# Patient Record
Sex: MALE | Race: WHITE | NOT HISPANIC OR LATINO | Employment: FULL TIME | URBAN - METROPOLITAN AREA
[De-identification: names, ages, dates, MRNs, and addresses within clinical notes are randomized per-mention and may not be internally consistent; named-entity substitution may affect disease eponyms.]

---

## 2023-04-28 DIAGNOSIS — F43.21 ADJUSTMENT DISORDER WITH DEPRESSED MOOD: ICD-10-CM

## 2023-04-28 RX ORDER — FLUOXETINE HYDROCHLORIDE 40 MG/1
CAPSULE ORAL
Qty: 90 CAPSULE | Refills: 1 | Status: SHIPPED | OUTPATIENT
Start: 2023-04-28 | End: 2023-08-22 | Stop reason: ALTCHOICE

## 2023-05-10 ENCOUNTER — TELEPHONE (OUTPATIENT)
Dept: PRIMARY CARE | Facility: CLINIC | Age: 43
End: 2023-05-10
Payer: COMMERCIAL

## 2023-05-10 DIAGNOSIS — L25.8 CONTACT DERMATITIS DUE TO OTHER AGENT, UNSPECIFIED CONTACT DERMATITIS TYPE: Primary | ICD-10-CM

## 2023-05-10 RX ORDER — PREDNISONE 10 MG/1
TABLET ORAL
Qty: 30 TABLET | Refills: 0 | Status: SHIPPED | OUTPATIENT
Start: 2023-05-10 | End: 2023-08-22 | Stop reason: SDUPTHER

## 2023-05-10 NOTE — TELEPHONE ENCOUNTER
Patient has poison ivy. Patient had a virtual visit with health insurance. Patient took last bit of prednisone today. Patient was prescribed 20 mg of prednisone. 18 tab, with taper. Patient is requesting a refill on a prednisone called in to [facility]. Please advise when called into pharmacy.

## 2023-05-10 NOTE — TELEPHONE ENCOUNTER
Spoke with patient-who has had poison ivy recurrent now on his face.  Strongly encouraged him to work his best to stay away from the poison ivy in his yard and be cautious with gloves or tools that might have poison ivy present on it.  He understands repeating the steroid orally should not be done that often.  We will do at this time as this is involving his face.  Once again encouraged him to do his best to maintain avoidance from the poison ivy

## 2023-06-12 ENCOUNTER — TELEPHONE (OUTPATIENT)
Dept: PRIMARY CARE | Facility: CLINIC | Age: 43
End: 2023-06-12
Payer: COMMERCIAL

## 2023-06-12 DIAGNOSIS — R69 TRAVEL-RELATED ILLNESS: Primary | ICD-10-CM

## 2023-06-12 RX ORDER — ACETAZOLAMIDE 125 MG/1
TABLET ORAL
Qty: 20 TABLET | Refills: 3 | Status: SHIPPED | OUTPATIENT
Start: 2023-06-12 | End: 2023-08-22 | Stop reason: SDUPTHER

## 2023-06-12 NOTE — TELEPHONE ENCOUNTER
[place] out west. Wonder if high altitude pills could be called in?    Pharmacy=  [facility] in [place] on [address]

## 2023-08-10 PROBLEM — R03.0 BLOOD PRESSURE ELEVATED WITHOUT HISTORY OF HTN: Status: ACTIVE | Noted: 2023-08-10

## 2023-08-10 PROBLEM — L23.7 POISON IVY DERMATITIS: Status: ACTIVE | Noted: 2023-08-10

## 2023-08-10 PROBLEM — H91.92 HEARING LOSS, LEFT: Status: ACTIVE | Noted: 2023-08-10

## 2023-08-10 PROBLEM — J01.00 SUBACUTE MAXILLARY SINUSITIS: Status: ACTIVE | Noted: 2023-08-10

## 2023-08-10 PROBLEM — J34.2 DEVIATED NASAL SEPTUM: Status: ACTIVE | Noted: 2023-08-10

## 2023-08-10 PROBLEM — M25.562 CHRONIC PAIN OF LEFT KNEE: Status: ACTIVE | Noted: 2023-08-10

## 2023-08-10 PROBLEM — E66.3 OVERWEIGHT (BMI 25.0-29.9): Status: ACTIVE | Noted: 2023-08-10

## 2023-08-10 PROBLEM — J32.2 CHRONIC ETHMOIDAL SINUSITIS: Status: ACTIVE | Noted: 2023-08-10

## 2023-08-10 PROBLEM — F43.21 SITUATIONAL DEPRESSION: Status: ACTIVE | Noted: 2023-08-10

## 2023-08-10 PROBLEM — J32.9 CHRONIC RHINOSINUSITIS: Status: ACTIVE | Noted: 2023-08-10

## 2023-08-10 PROBLEM — E78.5 DYSLIPIDEMIA: Status: ACTIVE | Noted: 2023-08-10

## 2023-08-10 PROBLEM — F41.0 PANIC ATTACKS: Status: ACTIVE | Noted: 2023-08-10

## 2023-08-10 PROBLEM — R09.81 NASAL CONGESTION: Status: ACTIVE | Noted: 2023-08-10

## 2023-08-10 PROBLEM — F41.9 ANXIETY: Status: ACTIVE | Noted: 2023-08-10

## 2023-08-10 PROBLEM — M25.551 HIP PAIN, RIGHT: Status: ACTIVE | Noted: 2023-08-10

## 2023-08-10 PROBLEM — J45.909 REACTIVE AIRWAY DISEASE (HHS-HCC): Status: ACTIVE | Noted: 2023-08-10

## 2023-08-10 PROBLEM — R51.9 FACIAL PAIN: Status: ACTIVE | Noted: 2023-08-10

## 2023-08-10 PROBLEM — R06.09 DYSPNEA ON EXERTION: Status: ACTIVE | Noted: 2023-08-10

## 2023-08-10 PROBLEM — R44.8 FACIAL PRESSURE: Status: ACTIVE | Noted: 2023-08-10

## 2023-08-10 PROBLEM — H93.8X3 EAR FULLNESS, BILATERAL: Status: ACTIVE | Noted: 2023-08-10

## 2023-08-10 PROBLEM — D22.9 MULTIPLE NEVI: Status: ACTIVE | Noted: 2023-08-10

## 2023-08-10 PROBLEM — G47.00 SLEEPLESSNESS: Status: ACTIVE | Noted: 2023-08-10

## 2023-08-10 PROBLEM — G89.29 CHRONIC PAIN OF LEFT KNEE: Status: ACTIVE | Noted: 2023-08-10

## 2023-08-10 PROBLEM — Z97.3 WEARS CONTACT LENSES: Status: ACTIVE | Noted: 2023-08-10

## 2023-08-10 PROBLEM — F45.8 BRUXISM (TEETH GRINDING): Status: ACTIVE | Noted: 2023-08-10

## 2023-08-22 ENCOUNTER — OFFICE VISIT (OUTPATIENT)
Dept: PRIMARY CARE | Facility: CLINIC | Age: 43
End: 2023-08-22
Payer: COMMERCIAL

## 2023-08-22 ENCOUNTER — LAB (OUTPATIENT)
Dept: LAB | Facility: LAB | Age: 43
End: 2023-08-22
Payer: COMMERCIAL

## 2023-08-22 VITALS
TEMPERATURE: 97.1 F | HEIGHT: 71 IN | WEIGHT: 205.2 LBS | BODY MASS INDEX: 28.73 KG/M2 | SYSTOLIC BLOOD PRESSURE: 129 MMHG | OXYGEN SATURATION: 98 % | DIASTOLIC BLOOD PRESSURE: 86 MMHG | HEART RATE: 69 BPM

## 2023-08-22 DIAGNOSIS — F41.9 ANXIETY: ICD-10-CM

## 2023-08-22 DIAGNOSIS — G89.29 CHRONIC LEFT SHOULDER PAIN: Primary | ICD-10-CM

## 2023-08-22 DIAGNOSIS — M25.552 LEFT HIP PAIN: ICD-10-CM

## 2023-08-22 DIAGNOSIS — M25.512 CHRONIC LEFT SHOULDER PAIN: Primary | ICD-10-CM

## 2023-08-22 DIAGNOSIS — E78.5 DYSLIPIDEMIA: ICD-10-CM

## 2023-08-22 DIAGNOSIS — Z00.00 ROUTINE HEALTH MAINTENANCE: ICD-10-CM

## 2023-08-22 DIAGNOSIS — D22.30 NEVUS OF FACE: ICD-10-CM

## 2023-08-22 DIAGNOSIS — D22.9 MULTIPLE NEVI: ICD-10-CM

## 2023-08-22 DIAGNOSIS — F41.0 PANIC ATTACKS: ICD-10-CM

## 2023-08-22 LAB
ALANINE AMINOTRANSFERASE (SGPT) (U/L) IN SER/PLAS: 19 U/L (ref 10–52)
ALBUMIN (G/DL) IN SER/PLAS: 4.4 G/DL (ref 3.4–5)
ALKALINE PHOSPHATASE (U/L) IN SER/PLAS: 77 U/L (ref 33–120)
ANION GAP IN SER/PLAS: 12 MMOL/L (ref 10–20)
ASPARTATE AMINOTRANSFERASE (SGOT) (U/L) IN SER/PLAS: 18 U/L (ref 9–39)
BILIRUBIN TOTAL (MG/DL) IN SER/PLAS: 0.9 MG/DL (ref 0–1.2)
CALCIUM (MG/DL) IN SER/PLAS: 9.4 MG/DL (ref 8.6–10.3)
CARBON DIOXIDE, TOTAL (MMOL/L) IN SER/PLAS: 28 MMOL/L (ref 21–32)
CHLORIDE (MMOL/L) IN SER/PLAS: 103 MMOL/L (ref 98–107)
CHOLESTEROL (MG/DL) IN SER/PLAS: 276 MG/DL (ref 0–199)
CHOLESTEROL IN HDL (MG/DL) IN SER/PLAS: 46.2 MG/DL
CHOLESTEROL/HDL RATIO: 6
CREATININE (MG/DL) IN SER/PLAS: 1.16 MG/DL (ref 0.5–1.3)
ERYTHROCYTE DISTRIBUTION WIDTH (RATIO) BY AUTOMATED COUNT: 13.3 % (ref 11.5–14.5)
ERYTHROCYTE MEAN CORPUSCULAR HEMOGLOBIN CONCENTRATION (G/DL) BY AUTOMATED: 32.6 G/DL (ref 32–36)
ERYTHROCYTE MEAN CORPUSCULAR VOLUME (FL) BY AUTOMATED COUNT: 90 FL (ref 80–100)
ERYTHROCYTES (10*6/UL) IN BLOOD BY AUTOMATED COUNT: 5.05 X10E12/L (ref 4.5–5.9)
GFR MALE: 79 ML/MIN/1.73M2
GLUCOSE (MG/DL) IN SER/PLAS: 86 MG/DL (ref 74–99)
HEMATOCRIT (%) IN BLOOD BY AUTOMATED COUNT: 45.4 % (ref 41–52)
HEMOGLOBIN (G/DL) IN BLOOD: 14.8 G/DL (ref 13.5–17.5)
LDL: 190 MG/DL (ref 0–99)
LEUKOCYTES (10*3/UL) IN BLOOD BY AUTOMATED COUNT: 6 X10E9/L (ref 4.4–11.3)
PLATELETS (10*3/UL) IN BLOOD AUTOMATED COUNT: 297 X10E9/L (ref 150–450)
POTASSIUM (MMOL/L) IN SER/PLAS: 4.4 MMOL/L (ref 3.5–5.3)
PROTEIN TOTAL: 7.4 G/DL (ref 6.4–8.2)
SODIUM (MMOL/L) IN SER/PLAS: 139 MMOL/L (ref 136–145)
THYROTROPIN (MIU/L) IN SER/PLAS BY DETECTION LIMIT <= 0.05 MIU/L: 1.62 MIU/L (ref 0.44–3.98)
TRIGLYCERIDE (MG/DL) IN SER/PLAS: 199 MG/DL (ref 0–149)
UREA NITROGEN (MG/DL) IN SER/PLAS: 17 MG/DL (ref 6–23)
VLDL: 40 MG/DL (ref 0–40)

## 2023-08-22 PROCEDURE — 80053 COMPREHEN METABOLIC PANEL: CPT

## 2023-08-22 PROCEDURE — 80061 LIPID PANEL: CPT

## 2023-08-22 PROCEDURE — 36415 COLL VENOUS BLD VENIPUNCTURE: CPT

## 2023-08-22 PROCEDURE — 85027 COMPLETE CBC AUTOMATED: CPT

## 2023-08-22 PROCEDURE — 84443 ASSAY THYROID STIM HORMONE: CPT

## 2023-08-22 PROCEDURE — 99396 PREV VISIT EST AGE 40-64: CPT | Performed by: STUDENT IN AN ORGANIZED HEALTH CARE EDUCATION/TRAINING PROGRAM

## 2023-08-22 PROCEDURE — 1036F TOBACCO NON-USER: CPT | Performed by: STUDENT IN AN ORGANIZED HEALTH CARE EDUCATION/TRAINING PROGRAM

## 2023-08-22 ASSESSMENT — ENCOUNTER SYMPTOMS
DIAPHORESIS: 0
DYSURIA: 0
SHORTNESS OF BREATH: 0
FEVER: 0
NAUSEA: 0
DIZZINESS: 0
LIGHT-HEADEDNESS: 0
CHILLS: 0
VOMITING: 0

## 2023-08-22 ASSESSMENT — PATIENT HEALTH QUESTIONNAIRE - PHQ9
2. FEELING DOWN, DEPRESSED OR HOPELESS: NOT AT ALL
1. LITTLE INTEREST OR PLEASURE IN DOING THINGS: NOT AT ALL
SUM OF ALL RESPONSES TO PHQ9 QUESTIONS 1 AND 2: 0

## 2023-08-22 NOTE — PROGRESS NOTES
"Subjective   Patient ID: Johnny Henry is a 43 y.o. male who presents for Annual Exam.  He is here for CPE.     Has been having left shoulder pain for last 8 months after ski trip. Seems to worsen with sleeping. 2 weekends ago he fell on his arm as it was across his chest and felt a ripping sensation in the shoulder. Had limited ROM of shoulder and was in a sling. Was using ice, motrin. ROM improving but still painful.     Also after skiing he had left hip pain in lateral hip, worse sitting on floor crosslegged, feels like a pinching sensation. Worse also with running. Had a fall when skiing which he thinks preceded the shoulder and hip pain.         Review of Systems   Constitutional:  Negative for chills, diaphoresis and fever.   HENT:  Negative for hearing loss.    Eyes:  Negative for visual disturbance.   Respiratory:  Negative for shortness of breath.    Cardiovascular:  Negative for chest pain.   Gastrointestinal:  Negative for nausea and vomiting.   Endocrine: Negative for cold intolerance and heat intolerance.   Genitourinary:  Negative for dysuria, scrotal swelling and testicular pain.   Skin:  Negative for rash (poison ivy when outside and exposed).   Neurological:  Negative for dizziness and light-headedness.       /86   Pulse 69   Temp 36.2 °C (97.1 °F)   Ht 1.803 m (5' 11\")   Wt 93.1 kg (205 lb 3.2 oz)   SpO2 98%   BMI 28.62 kg/m²     Objective   Physical Exam  Vitals reviewed.   Constitutional:       General: He is not in acute distress.     Appearance: Normal appearance.   HENT:      Head: Normocephalic.      Right Ear: Tympanic membrane, ear canal and external ear normal. There is no impacted cerumen.      Left Ear: Tympanic membrane, ear canal and external ear normal. There is no impacted cerumen.      Mouth/Throat:      Mouth: Mucous membranes are moist.      Pharynx: Oropharynx is clear. No oropharyngeal exudate or posterior oropharyngeal erythema.   Cardiovascular:      Rate and " Rhythm: Normal rate and regular rhythm.   Pulmonary:      Effort: Pulmonary effort is normal. No respiratory distress.      Breath sounds: Normal breath sounds.   Abdominal:      General: Bowel sounds are normal. There is no distension.      Palpations: Abdomen is soft. There is no mass.      Tenderness: There is no abdominal tenderness. There is no guarding or rebound.   Musculoskeletal:         General: No deformity.      Cervical back: Neck supple. No tenderness.      Comments: No midline spinal tenderness. + apley scratch test bilaterally. - asis compression test.   Lymphadenopathy:      Cervical: No cervical adenopathy.   Skin:     Coloration: Skin is not jaundiced.   Neurological:      Mental Status: He is alert.         Assessment/Plan   Problem List Items Addressed This Visit       Anxiety    Dyslipidemia    Multiple nevi    Panic attacks    Nevus of face     Other Visit Diagnoses       Chronic left shoulder pain    -  Primary    Relevant Orders    Referral to Orthopaedic Surgery    XR shoulder left 2+ views    Referral to Sports Medicine    Referral to Physical Therapy    Left hip pain        Relevant Orders    Referral to Sports Medicine    Referral to Physical Therapy    Routine health maintenance        Relevant Orders    Lipid Panel    Comprehensive Metabolic Panel    CBC (Completed)    TSH with reflex to Free T4 if abnormal        Greater tronchanteric pain syndrome  -Referred to sports medicine  -PT ordered    Left shoulder pain  -referred to shoulder specialist.   -Xray ordered  -PT ordered    Labs updated June '22.    Living situation-after living in the [facility] for years, he and his family moved to [place] in the summer 2021        Panic attacks with situational depression/anxiety-originally occurred around 2000 after he got out of the [employer], when his parents were getting a divorce and he had not yet been able to restart school. [facility]. He notes that  as an adolescent he had ADHD issues-in social anxiety issues as well in the past.   His symptoms recurred early June 2021 while they were putting off her down on the house that he and his wife decided to buy after some 14 years in their small starter home. His anxiousness and apprehension and overall uneasiness is quite similar to what happened before he notes. After establishing with psychology-Dr. Sam Pruett and starting fluoxetine and working with psychiatry his symptoms improved dramatically   Unfortunately he did not follow-up with anyone, and he stopped his medications. Sometime in the springtime his symptoms started to recur with worsening sleeplessness initially terminal insomnia but then also initial insomnia as well, with more stressors with his twin sons. We spoke at length regarding the need for ongoing consistent approach as below.   Presently back on meds. saw counsellor, exercising. avoiding alcohol. Overall doing well. Has not needed the Xanax in over 6 months he states. Exercising consistently. Sleeping better with melatonin now. Encouraged him to be mindful in the small victories, noting accomplishments, though minor, add up. He'll stop the alprazolam, work to restart regular exercise routine, and continue fluoxetine. Using this intermittently, 20mg/day.     Hx Panic attacks-regarding the alprazolam and panic attacks- CSA updated 6/28/22. States he has not used the alprazolam in over 6 months so we will discontinue. He will get back to exercising which helps relieve stress and he will remain on fluoxetine. He has worked to regain the balance between life and work demands which has helped a lot. The final concern is working with his twin son who has a negative attitude.     Sleeplessness - Longstanding issue for yrs. mainly wakes at 3am. he understands fluoxetine will help this. Advised to stop medicating with alprazolam for sleep aid. Encouraged to continue practicing sleep hygiene and melatonin  trial - start w/ 10 mg, advance to 25-30mg and use consistently   He has found that [facility] melatonin 10 mg sufficient and will continue as needed. Unchanged.      Elevated blood pressure-likely related to his stress-we will reassess on follow-up   Presently improved. Improved on recheck today.      dyslipidemia/elevated weight-we'll check annual lipids. With his wife as a dietitian, his diet remains quite healthy.   June '22 Lipids relatively favorable. Though LDL is 127. BMI 28. He'll resume exercising. Reorder labs.      Exercise routine- currently walking daily and playing golf.    He will start a new routine soon. Encouraged him to complete 3-4 days of exercise weekly.      Right hip and knee pain- saw specialist in 2018. He has a small right hip spur. Hip sore mainly after running. Exam today shows Main sore spot superior to the bursa. Encouraged him to review information on line Regarding IT band syndrome & piriformis syndrome. This seems less likely to be bursitis though he will avoid sleeping on that side accordingly. Ice, Aleve, stretching and caution with extended running encouraged follow-up with concerns. Improved and very mild.      Right foot plantar fasciitis - improved now. stretching encouraged. Resolved     Functional sinusitis / Seasonal congestion/eustachian tube presently off Zyrtec and fluticasone following spring '18 allergy and ENT eval per Dr. Espino and Dr. Pinzon. humidified air in winter and if needed, restart Singulair. He will follow up either here or there with concerns.     Reactive airway disease - now running 3 x wk this summer, following unremarkable PFTs per Dr. Espino summer '18. When necessary Singulair in the winter if needed. No issues this year.     Chronic knee pain-less problematic of late     Right scapular spasm-heating pad and stretching encouraged follow-up with concerns for this mild trapezius muscle strain most noticeable work. Encouraged optimizing position  at work particularly sitting with his work station. Unfortunately he does not have the standing desk option.    Dental plan/bruxism- he will continue dental visits semiannually. mouthguard nightly has been used, but he forgets often he notes.     Back nevi - noticed by spouse. These may represent seborrheic keratoses. He will see dermatology accordingly. Saw Dermatologist in spring '19 - Dr. Jean Baptiste, who froze several lesions.  Face nevi-present over last year, unchanged. Advised follow up with dermatology regarding this.      Contact lenses / vision care - Dr. Melissa Davey annually. Now seeing clear choice who did lasix 2022.      Summer safety concerns - sun screen, tick precautions, insect repellant, yard work safety encouraged last year at his physical        Flu shot encouraged after Labor Day- updated 9/22     Tetanus - updated June '22     Follow-up 6 months, sooner as needed.     Labs ordered     CPE completed.  Advised to keep a heart healthy, low fat  diet with fruits and veggies like Mediterranean diet.  Advised on the importance of exercise and maintaining 150 minutes of exercise per week (30 minutes per day 5 days a week).  Advised on regular eye and dental visits.  Discussed age appropriate cancer screening, immunizations and recommendations given.  Discussed avoiding illicit drugs and tobacco. Advised on appropriate use of alcohol.  Advised to wear seat belt.

## 2023-08-31 ENCOUNTER — TELEPHONE (OUTPATIENT)
Dept: PRIMARY CARE | Facility: CLINIC | Age: 43
End: 2023-08-31
Payer: COMMERCIAL

## 2023-08-31 NOTE — TELEPHONE ENCOUNTER
Pt saw Dr Orellana for his annual. While in for that he discussed some shoulder pain and he was referred to see Dr Miguel Hart in Sports Medicine in [facility] per Dr Orellana. Pt is asking if Dr Nevarez has another other physicians he might suggest due to not having any appts until October. He was asking if maybe he could see someone in [facility]. Please advise. Thank you!

## 2023-10-02 ENCOUNTER — APPOINTMENT (OUTPATIENT)
Dept: ORTHOPEDIC SURGERY | Facility: CLINIC | Age: 43
End: 2023-10-02
Payer: COMMERCIAL

## 2023-10-19 ENCOUNTER — APPOINTMENT (OUTPATIENT)
Dept: ORTHOPEDIC SURGERY | Facility: CLINIC | Age: 43
End: 2023-10-19
Payer: COMMERCIAL

## 2023-10-24 ENCOUNTER — OFFICE VISIT (OUTPATIENT)
Dept: PRIMARY CARE | Facility: CLINIC | Age: 43
End: 2023-10-24
Payer: COMMERCIAL

## 2023-10-24 VITALS
HEIGHT: 71 IN | BODY MASS INDEX: 28.87 KG/M2 | WEIGHT: 206.2 LBS | SYSTOLIC BLOOD PRESSURE: 136 MMHG | OXYGEN SATURATION: 98 % | HEART RATE: 66 BPM | TEMPERATURE: 98.8 F | DIASTOLIC BLOOD PRESSURE: 80 MMHG

## 2023-10-24 DIAGNOSIS — E66.3 OVERWEIGHT WITH BODY MASS INDEX (BMI) OF 28 TO 28.9 IN ADULT: Chronic | ICD-10-CM

## 2023-10-24 DIAGNOSIS — R03.0 BLOOD PRESSURE ELEVATED WITHOUT HISTORY OF HTN: Chronic | ICD-10-CM

## 2023-10-24 DIAGNOSIS — F41.9 ANXIETY: Primary | ICD-10-CM

## 2023-10-24 DIAGNOSIS — G89.29 CHRONIC PAIN OF LEFT KNEE: Chronic | ICD-10-CM

## 2023-10-24 DIAGNOSIS — E78.5 DYSLIPIDEMIA, GOAL LDL BELOW 100: Chronic | ICD-10-CM

## 2023-10-24 DIAGNOSIS — M25.562 CHRONIC PAIN OF LEFT KNEE: Chronic | ICD-10-CM

## 2023-10-24 DIAGNOSIS — Z23 NEED FOR INFLUENZA VACCINATION: ICD-10-CM

## 2023-10-24 DIAGNOSIS — M25.552 LEFT HIP PAIN: Chronic | ICD-10-CM

## 2023-10-24 DIAGNOSIS — M25.512 CHRONIC LEFT SHOULDER PAIN: Chronic | ICD-10-CM

## 2023-10-24 DIAGNOSIS — G89.29 CHRONIC LEFT SHOULDER PAIN: Chronic | ICD-10-CM

## 2023-10-24 PROBLEM — M25.551 HIP PAIN, RIGHT: Status: RESOLVED | Noted: 2023-08-10 | Resolved: 2023-10-24

## 2023-10-24 PROBLEM — L23.7 POISON IVY DERMATITIS: Status: RESOLVED | Noted: 2023-08-10 | Resolved: 2023-10-24

## 2023-10-24 PROCEDURE — 3008F BODY MASS INDEX DOCD: CPT | Performed by: INTERNAL MEDICINE

## 2023-10-24 PROCEDURE — 90471 IMMUNIZATION ADMIN: CPT | Performed by: INTERNAL MEDICINE

## 2023-10-24 PROCEDURE — 90686 IIV4 VACC NO PRSV 0.5 ML IM: CPT | Performed by: INTERNAL MEDICINE

## 2023-10-24 PROCEDURE — 1036F TOBACCO NON-USER: CPT | Performed by: INTERNAL MEDICINE

## 2023-10-24 PROCEDURE — 99214 OFFICE O/P EST MOD 30 MIN: CPT | Performed by: INTERNAL MEDICINE

## 2023-10-24 RX ORDER — IBUPROFEN 800 MG/1
800 TABLET ORAL 3 TIMES DAILY PRN
COMMUNITY
Start: 2023-09-11 | End: 2024-04-11 | Stop reason: ALTCHOICE

## 2023-10-24 RX ORDER — FLUOXETINE 10 MG/1
10 CAPSULE ORAL DAILY
Qty: 30 CAPSULE | Refills: 5 | Status: SHIPPED | OUTPATIENT
Start: 2023-10-24 | End: 2024-03-01

## 2023-10-24 ASSESSMENT — PATIENT HEALTH QUESTIONNAIRE - PHQ9
2. FEELING DOWN, DEPRESSED OR HOPELESS: NOT AT ALL
SUM OF ALL RESPONSES TO PHQ9 QUESTIONS 1 AND 2: 0
1. LITTLE INTEREST OR PLEASURE IN DOING THINGS: NOT AT ALL

## 2023-10-24 ASSESSMENT — ENCOUNTER SYMPTOMS
DEPRESSION: 0
LOSS OF SENSATION IN FEET: 0
OCCASIONAL FEELINGS OF UNSTEADINESS: 0

## 2023-10-24 NOTE — PROGRESS NOTES
"Subjective   Patient ID: Johnny Henry is a 43 y.o. male who presents for Follow-up.    Here for follow-up and flu shot.  Left shoulder has been sore for several weeks since he fell running with his boys, playing [employer].    Left knee has been sore since January and the ski accident on and off.    Left hip has been sore since he was working on a home project with baseboard sitting off-and-on for some time    He has been on the fluoxetine for some time he wonders if this needs to continue.  He has not really had any issues with anxiety though he did have 's present for about 6 weeks and he did not sleep well during that time.  Not sleeping better         Review of Systems    Objective   /80   Pulse 66   Temp 37.1 °C (98.8 °F)   Ht 1.803 m (5' 11\")   Wt 93.5 kg (206 lb 3.2 oz)   SpO2 98%   BMI 28.76 kg/m²     Physical Exam  Vitals reviewed.   Constitutional:       Appearance: Normal appearance.   HENT:      Head: Normocephalic and atraumatic.   Eyes:      General: No scleral icterus.        Right eye: No discharge.         Left eye: No discharge.      Extraocular Movements: Extraocular movements intact.      Conjunctiva/sclera: Conjunctivae normal.      Pupils: Pupils are equal, round, and reactive to light.   Cardiovascular:      Rate and Rhythm: Normal rate and regular rhythm.      Pulses: Normal pulses.      Heart sounds: Normal heart sounds. No murmur heard.  Pulmonary:      Effort: Pulmonary effort is normal.      Breath sounds: Normal breath sounds. No wheezing or rhonchi.   Musculoskeletal:         General: No deformity or signs of injury. Normal range of motion.      Cervical back: Normal range of motion and neck supple. No rigidity or tenderness.      Comments: Left shoulder with near normal range of motion-pain localized left deltoid area    Left hip was sore posterior and superior to the left greater trochanteric bursa    Left knee was sore mainly along the joint line medially no " effusions  Cruciate and collateral ligaments appeared intact   Lymphadenopathy:      Cervical: No cervical adenopathy.   Skin:     General: Skin is warm and dry.      Findings: No rash.   Neurological:      General: No focal deficit present.      Mental Status: He is alert and oriented to person, place, and time. Mental status is at baseline.      Cranial Nerves: No cranial nerve deficit.      Sensory: No sensory deficit.      Gait: Gait normal.   Psychiatric:         Mood and Affect: Mood normal.         Behavior: Behavior normal.         Thought Content: Thought content normal.         Judgment: Judgment normal.         Assessment/Plan   Problem List Items Addressed This Visit             ICD-10-CM    Anxiety - Primary F41.9    Relevant Medications    FLUoxetine (PROzac) 10 mg capsule    Blood pressure elevated without history of HTN R03.0    Chronic pain of left knee M25.562, G89.29    Dyslipidemia, goal LDL below 100 E78.5    Left hip pain M25.552    Overweight with body mass index (BMI) of 28 to 28.9 in adult E66.3, Z68.28    Chronic left shoulder pain M25.512, G89.29     Other Visit Diagnoses         Codes    Need for influenza vaccination     Z23    Relevant Orders    Flu vaccine (IIV4) age 6 months and greater, preservative free (Completed)          Panic attacks with situational depression/anxiety-originally occurred around 2000 after he got out of the [employer], when his parents were getting a divorce and he had not yet been able to restart school. [facility] helpful. He notes that as an adolescent he had ADHD issues-in social anxiety issues as well in the past.   His symptoms recurred early June 2021 while they were putting off her down on the house that he and his wife decided to buy after some 14 years in their small starter home. His anxiousness and apprehension and overall uneasiness is quite similar to what happened before he notes. After establishing with psychology-Dr. Vargas  Flynn and starting fluoxetine and working with psychiatry his symptoms improved dramatically.  He has not needed anxiety medicines in some time.              10/23-on 20 mg of fluoxetine consistently he is done quite well this year.  He did have some sleep issues when he had 's over for 6 weeks.  Improved presently.  I suggested trying to reduce the fluoxetine from 20 down to 10 mg daily.  He will call with concerns          Sleeplessness - Longstanding issue for yrs. mainly wakes at 3am. he understands fluoxetine will help this. Advised to stop medicating with alprazolam for sleep aid. Encouraged to continue practicing sleep hygiene and melatonin trial - start w/ 10 mg, advance to 25-30mg and use consistently   He has found that [facility] melatonin 10 mg sufficient and will continue as needed     Elevated blood pressure-likely related to his stress-we will reassess on follow-up              Presently improved.       dyslipidemia/elevated weight-we'll check annual lipids. With his wife as a dietitian, his diet remains quite healthy.   June '22 Lipids relatively favorable. Though LDL is 127. BMI 28. He'll resume exercising             10/23-lipids unfortunately worsened-LDL 90 range.  BMI 28.  Exercise and weight loss     Exercise routine- currently walking daily and playing golf.    He will start a new routine soon. Encouraged him to complete 3-4 days of exercise weekly.              Considering his knee-suggested swimming, elliptical or recumbent cycle 3 to 4 days weekly    Left shoulder pain-since August when he fell playing [employer] onto his left shoulder.  Rotator cuff strength appears near normal no evidence of adhesive capsulitis.  Encouraged heating pad for 20 to 30 minutes and stretch-2 page handout provided/reviewed if not improved he will follow-up with orthopedics    Left hip pain-exam suggest IT band syndrome or piriformis  Rather than Bursitis pain.  He will avoid positions such as sitting  on the floor which has worsened this, and pursue exercises for IT band syndrome and piriformis and follow-up with orthopedics if not improved    Left knee injury-suspect meniscal skin injury from January 2023 CT accident.  Fortunately no mechanical symptoms.  I encouraged him to offload this.  He will avoid other activities that tend to worsen this.  He will watch for mechanical symptoms.  He will follow-up with orthopedics if not improved    Laboratory-suggested 1-2 Aleve as needed         Right hip and knee pain- saw specialist in 2018. He has a small right hip spur. Hip sore mainly after running. Exam today shows Main sore spot superior to the bursa. Encouraged him to review information on line Regarding IT band syndrome & piriformis syndrome. This seems less likely to be bursitis though he will avoid sleeping on that side accordingly. Ice, Aleve, stretching and caution with extended running encouraged follow-up with concerns     Right foot plantar fasciitis - improved now. stretching encouraged     Functional sinusitis / Seasonal congestion/eustachian tube presently off Zyrtec and fluticasone following spring '18 allergy and ENT eval per Dr. Espino and Dr. Pinzon. humidified air in winter and if needed, restart Singulair. He will follow up either here or there with concerns.     Reactive airway disease - now running 3 x wk this summer, following unremarkable PFTs per Dr. Espino summer '18. When necessary Singulair in the winter if needed.       Right scapular spasm-heating pad and stretching encouraged follow-up with concerns for this mild trapezius muscle strain most noticeable work. Encouraged optimizing position at work particularly sitting with his work station. Unfortunately he does not have the standing desk option.        Dental plan/bruxism- he will continue dental visits semiannually. mouthguard nightly has been used, but he forgets often he notes.     Back nevi - noticed by spouse. These may  represent seborrheic keratoses. He will see dermatology accordingly. Saw Dermatologist in spring '19 - Dr. Jean Baptiste, who froze several lesions.     Contact lenses / vision care - Dr. Melissa Davey annually     Summer safety concerns - sun screen, tick precautions, insect repellant, yard work safety encouraged last year at his physical        Flu shot encouraged after Labor Day- updated 10/24/23 - today     Tetanus - updated June '22     Follow-up 6 months, sooner as needed.      Charting was completed using voice recognition technology and may include unintended errors.

## 2023-12-08 ENCOUNTER — ANCILLARY PROCEDURE (OUTPATIENT)
Dept: RADIOLOGY | Facility: CLINIC | Age: 43
End: 2023-12-08
Payer: COMMERCIAL

## 2023-12-08 ENCOUNTER — OFFICE VISIT (OUTPATIENT)
Dept: ORTHOPEDIC SURGERY | Facility: CLINIC | Age: 43
End: 2023-12-08
Payer: COMMERCIAL

## 2023-12-08 DIAGNOSIS — M25.562 ACUTE PAIN OF LEFT KNEE: ICD-10-CM

## 2023-12-08 DIAGNOSIS — M25.552 LEFT HIP PAIN: Primary | ICD-10-CM

## 2023-12-08 PROCEDURE — 73560 X-RAY EXAM OF KNEE 1 OR 2: CPT | Mod: LT,FY

## 2023-12-08 PROCEDURE — 99214 OFFICE O/P EST MOD 30 MIN: CPT | Performed by: ORTHOPAEDIC SURGERY

## 2023-12-08 PROCEDURE — 3008F BODY MASS INDEX DOCD: CPT | Performed by: ORTHOPAEDIC SURGERY

## 2023-12-08 PROCEDURE — 1036F TOBACCO NON-USER: CPT | Performed by: ORTHOPAEDIC SURGERY

## 2023-12-08 PROCEDURE — 73560 X-RAY EXAM OF KNEE 1 OR 2: CPT | Mod: LEFT SIDE | Performed by: ORTHOPAEDIC SURGERY

## 2023-12-08 NOTE — PROGRESS NOTES
History of Present Illness   Patient presents today for follow-up from his left rotator cuff tendinitis as well as a new complaint of left knee pain.  States that shoulder is getting a lot better he has been doing some weight training on it.  The Medrol Dosepak and ibuprofen did seem to help.  In terms of his knee he has had injury several years ago when he was running he had excruciating pain in the knee on the inside.  He states has been on and off but the last 2 years is been aggravating him.  He likes to do [employer] training and he has pain at night as well as pain when the knee is twisted.  Denies any locking up or catching.  Never any surgeries or injections on the knee does not use a brace.  He has been icing it consistently taking ibuprofen.  Tried doing therapy with rehab through percutaneous tube.  This was skiing which related to review the     Review of Systems   GENERAL: Negative for malaise, significant weight loss, fever  MUSCULOSKELETAL: see HPI  NEURO:  Negative     Physical Exam  General appearance well-nourished well-developed AOx3 no acute distress  Right shoulder exam shows full active range of motion of the shoulder 5 out of 5 strength with male muscle strength testing's mild discomfort with external rotation     Left knee exam shows skin intact full active range of motion the medial joint line soreness to palpation positive Darrion's and Apley's compression grind.  Lower extremity is neurovascular tact.  Trace to 1+ effusion.  He is able flex to 110% pain in full extension.     Imaging  No acute fractures or dislocations.  No arthritic change     Assessment   Left knee pain, internal derangement  Left shoulder rotator cuff tendinitis     Plan  1.  MRI of his left knee.  Discussed possibility of underlying meniscus tear as well as cartilage pathology.  Discussed potential arthroscopic surgical options  2.  Ibuprofen.  GI side effects medical risk discussed.  3.  Ice his knee.  Home exercise  program.  Over-the-counter bracing.  4.  Overall his shoulder is improving he will continue with conservative treatment  Follow-up after MRI    Past Medical History:   Diagnosis Date    Acute frontal sinusitis, unspecified 03/27/2018    Acute frontal sinusitis    Acute maxillary sinusitis, unspecified 12/11/2019    Subacute maxillary sinusitis    Acute tonsillitis, unspecified 08/14/2016    Acute tonsillitis    Allergic contact dermatitis due to plants, except food 10/03/2019    Poison ivy dermatitis    Chronic ethmoidal sinusitis 11/30/2021    Chronic ethmoidal sinusitis    Chronic rhinitis 02/14/2020    Chronic rhinosinusitis    Elevated blood-pressure reading, without diagnosis of hypertension 07/30/2014    Elevated blood pressure reading without diagnosis of hypertension    Elevated blood-pressure reading, without diagnosis of hypertension 06/28/2022    Blood pressure elevated without history of HTN    Encounter for examination of ears and hearing without abnormal findings 08/07/2018    Encounter for hearing evaluation    Hesitancy of micturition 03/10/2021    Urinary hesitancy    Lyme disease, unspecified     Lyme disease    Other abnormal auditory perceptions, unspecified ear 05/23/2018    Abnormal auditory perception    Other conditions influencing health status     Pneumonia    Other conditions influencing health status 09/14/2016    Counseling About Travel    Other forms of dyspnea 04/10/2020    Dyspnea on exertion    Other specified disorders of ear, bilateral 03/30/2020    Ear fullness, bilateral    Other symptoms and signs involving general sensations and perceptions 04/21/2021    Facial pressure    Pain in left knee 08/10/2017    Left knee pain, unspecified chronicity    Pain in right hip 08/08/2019    Hip pain, right    Personal history of other diseases of the nervous system and sense organs 01/05/2016    History of acute otitis media    Personal history of other diseases of the nervous system and  sense organs 05/16/2018    History of eustachian tube dysfunction    Personal history of other diseases of the nervous system and sense organs 06/21/2015    History of acute otitis media    Personal history of other diseases of the nervous system and sense organs 05/23/2018    History of eustachian tube dysfunction    Personal history of other diseases of the respiratory system 04/13/2019    History of tonsillitis    Personal history of other diseases of the respiratory system 08/14/2016    History of sore throat    Personal history of other diseases of the respiratory system 12/12/2017    History of acute bronchitis    Personal history of other diseases of the respiratory system 04/13/2019    History of upper respiratory infection    Personal history of other diseases of the respiratory system 11/24/2014    History of sinusitis    Personal history of other diseases of the respiratory system 08/08/2018    History of chronic sinusitis    Personal history of other specified conditions 11/30/2021    History of nasal congestion    Personal history of other specified conditions 08/23/2013    History of nausea    Personal history of other specified conditions 09/26/2022    History of insomnia    Personal history of other specified conditions 04/21/2021    History of facial pain    Unspecified hearing loss, left ear 12/26/2019    Hearing loss, left       Medication Documentation Review Audit       Reviewed by Cheyenne Davis MA (Medical Assistant) on 12/08/23 at 0849      Medication Order Taking? Sig Documenting Provider Last Dose Status   FLUoxetine (PROzac) 10 mg capsule [id number]  Take 1 capsule (10 mg) by mouth once daily. Gerardo Nevarez MD  Active   ibuprofen 800 mg tablet [id number] No Take 1 tablet (800 mg) by mouth 3 times a day as needed. Take with food. Historical Provider, MD Not Taking Active                    Allergies   Allergen Reactions    Nitrofurantoin Monohyd/M-Cryst Nausea Only       Social History      Socioeconomic History    Marital status:      Spouse name: Not on file    Number of children: Not on file    Years of education: Not on file    Highest education level: Not on file   Occupational History    Not on file   Tobacco Use    Smoking status: Never     Passive exposure: Never    Smokeless tobacco: Never   Substance and Sexual Activity    Alcohol use: Yes     Alcohol/week: 9.0 standard drinks of alcohol     Types: 9 Standard drinks or equivalent per week     Comment: 3 times a week    Drug use: Never    Sexual activity: Not on file   Other Topics Concern    Not on file   Social History Narrative    Not on file     Social Determinants of Health     Financial Resource Strain: Not on file   Food Insecurity: Not on file   Transportation Needs: Not on file   Physical Activity: Not on file   Stress: Not on file   Social Connections: Not on file   Intimate Partner Violence: Not on file   Housing Stability: Not on file       History reviewed. No pertinent surgical history.

## 2024-01-02 DIAGNOSIS — M25.552 LEFT HIP PAIN: Primary | ICD-10-CM

## 2024-01-05 ENCOUNTER — HOSPITAL ENCOUNTER (OUTPATIENT)
Dept: RADIOLOGY | Facility: HOSPITAL | Age: 44
Discharge: HOME | End: 2024-01-05
Payer: COMMERCIAL

## 2024-01-05 DIAGNOSIS — M25.552 LEFT HIP PAIN: ICD-10-CM

## 2024-01-05 DIAGNOSIS — M25.562 ACUTE PAIN OF LEFT KNEE: ICD-10-CM

## 2024-01-05 PROCEDURE — 73721 MRI JNT OF LWR EXTRE W/O DYE: CPT | Mod: LEFT SIDE | Performed by: STUDENT IN AN ORGANIZED HEALTH CARE EDUCATION/TRAINING PROGRAM

## 2024-01-05 PROCEDURE — 73721 MRI JNT OF LWR EXTRE W/O DYE: CPT | Mod: LT

## 2024-01-08 ENCOUNTER — OFFICE VISIT (OUTPATIENT)
Dept: ORTHOPEDIC SURGERY | Facility: CLINIC | Age: 44
End: 2024-01-08
Payer: COMMERCIAL

## 2024-01-08 DIAGNOSIS — S83.232D COMPLEX TEAR OF MEDIAL MENISCUS OF LEFT KNEE AS CURRENT INJURY, SUBSEQUENT ENCOUNTER: Primary | ICD-10-CM

## 2024-01-08 PROCEDURE — 3008F BODY MASS INDEX DOCD: CPT | Performed by: ORTHOPAEDIC SURGERY

## 2024-01-08 PROCEDURE — 99214 OFFICE O/P EST MOD 30 MIN: CPT | Performed by: ORTHOPAEDIC SURGERY

## 2024-01-08 PROCEDURE — 1036F TOBACCO NON-USER: CPT | Performed by: ORTHOPAEDIC SURGERY

## 2024-01-08 NOTE — H&P (VIEW-ONLY)
History of Present Illness   Patient returns today states unfortunately his knee and hip are not doing much better.  He has had a notable history of his left knee been pretty sore and uncomfortable for almost a year since a skiing incident.  Ever since he twisted it has been sore uncomfortable with some reproducible mechanical symptoms does get ill stiff and swollen.  Also states his left hip is fairly bothersome with repetitive activities.  Extended period of walking gets reproducible hip pain which gets pretty intense at times and locates that the lateral side of the hip.  Review of Systems   GENERAL: Negative for malaise, significant weight loss, fever  MUSCULOSKELETAL: see HPI  NEURO:  Negative     Physical Exam  General appearance well-nourished well-developed AOx3 no acute distress  Left hip: Moderate pain over the trochanter.  Positive IAN test.  Negative impingement with good motion to the hip    Left knee exam shows skin intact full active range of motion the medial joint line soreness to palpation positive Darrion's and Apley's compression grind.  Lower extremity is neurovascular tact.  Trace to 1+ effusion.  He is able flex to 110% pain in full extension.     Imaging  No acute fractures or dislocations.  No arthritic change  MRI left hip: Moderate tendinosis within the gluteal muscles with some reactive edema.  Small degenerative anterior superior labral tear  MRI of the left knee: Small complex tear medial meniscus undersurface posterior, anterior horn lateral meniscus tear    Assessment   Left knee pain, medial/lateral meniscus tear  Left trochanteric bursitis with labral tear  Plan  1.  I reviewed with the patient overall think his left hip is fairly normal in terms of pathology.  Discussed he does have a small degenerative labral tear which I do not think at this point is bothering him.  I recommended the ultrasound-guided injection to the trochanter 8 cc lidocaine of 1 of cc of Celestone.  To  treat this is predominantly trochanteric bursitis and gluteal tendinitis, issue.  I discussed that I think this will resolve with time.  We outlined all of his MRI findings    2.  Left knee: I recommended left knee arthroscopy with partial medial and lateral meniscectomy with a possible repair  Menisectomy plan of care:  Risks benefits and alternatives to surgery were discussed including but not limited to Infection, bleeding, neurovascular injury, pain and dysfunction, hardware related complications including cutout failure breakage, loss of function, motion, and permanent disability as well as the cardiovascular and pulmonary complications from anesthesia including death and DVT. Patient and family accept these risks.  We discussed specifically post meniscectomy pain, incomplete pain relief, meniscus retear, progressive arthritis, intraoperative decisions in regards to other pathology, and the potential for future revision surgeries including arthroplasty    Plan for outpatient surgery   1. 1 week postop follow up   2. Percocet for postop pain relief. OARRS has been reviewed and is consistent with prescribed medications. This report is scanned into the electronic medical record. The risks of abuse, dependence, addiction and diversion were considered. The medication is felt to be clinically appropriate based on documented diagnosis .  3.  Pre-CERT for removal postoperative knee brace    Past Medical History:   Diagnosis Date    Acute frontal sinusitis, unspecified 03/27/2018    Acute frontal sinusitis    Acute maxillary sinusitis, unspecified 12/11/2019    Subacute maxillary sinusitis    Acute tonsillitis, unspecified 08/14/2016    Acute tonsillitis    Allergic contact dermatitis due to plants, except food 10/03/2019    Poison ivy dermatitis    Chronic ethmoidal sinusitis 11/30/2021    Chronic ethmoidal sinusitis    Chronic rhinitis 02/14/2020    Chronic rhinosinusitis    Elevated blood-pressure reading, without  diagnosis of hypertension 07/30/2014    Elevated blood pressure reading without diagnosis of hypertension    Elevated blood-pressure reading, without diagnosis of hypertension 06/28/2022    Blood pressure elevated without history of HTN    Encounter for examination of ears and hearing without abnormal findings 08/07/2018    Encounter for hearing evaluation    Hesitancy of micturition 03/10/2021    Urinary hesitancy    Lyme disease, unspecified     Lyme disease    Other abnormal auditory perceptions, unspecified ear 05/23/2018    Abnormal auditory perception    Other conditions influencing health status     Pneumonia    Other conditions influencing health status 09/14/2016    Counseling About Travel    Other forms of dyspnea 04/10/2020    Dyspnea on exertion    Other specified disorders of ear, bilateral 03/30/2020    Ear fullness, bilateral    Other symptoms and signs involving general sensations and perceptions 04/21/2021    Facial pressure    Pain in left knee 08/10/2017    Left knee pain, unspecified chronicity    Pain in right hip 08/08/2019    Hip pain, right    Personal history of other diseases of the nervous system and sense organs 01/05/2016    History of acute otitis media    Personal history of other diseases of the nervous system and sense organs 05/16/2018    History of eustachian tube dysfunction    Personal history of other diseases of the nervous system and sense organs 06/21/2015    History of acute otitis media    Personal history of other diseases of the nervous system and sense organs 05/23/2018    History of eustachian tube dysfunction    Personal history of other diseases of the respiratory system 04/13/2019    History of tonsillitis    Personal history of other diseases of the respiratory system 08/14/2016    History of sore throat    Personal history of other diseases of the respiratory system 12/12/2017    History of acute bronchitis    Personal history of other diseases of the respiratory  system 04/13/2019    History of upper respiratory infection    Personal history of other diseases of the respiratory system 11/24/2014    History of sinusitis    Personal history of other diseases of the respiratory system 08/08/2018    History of chronic sinusitis    Personal history of other specified conditions 11/30/2021    History of nasal congestion    Personal history of other specified conditions 08/23/2013    History of nausea    Personal history of other specified conditions 09/26/2022    History of insomnia    Personal history of other specified conditions 04/21/2021    History of facial pain    Unspecified hearing loss, left ear 12/26/2019    Hearing loss, left       Medication Documentation Review Audit       Reviewed by Altagracia Jiménez on 01/08/24 at 1124      Medication Order Taking? Sig Documenting Provider Last Dose Status   FLUoxetine (PROzac) 10 mg capsule [id number]  Take 1 capsule (10 mg) by mouth once daily. Gerardo Nevarez MD  Active   ibuprofen 800 mg tablet [id number] No Take 1 tablet (800 mg) by mouth 3 times a day as needed. Take with food. Historical Provider, MD Not Taking Active                    Allergies   Allergen Reactions    Nitrofurantoin Monohyd/M-Cryst Nausea Only       Social History     Socioeconomic History    Marital status:      Spouse name: Not on file    Number of children: Not on file    Years of education: Not on file    Highest education level: Not on file   Occupational History    Not on file   Tobacco Use    Smoking status: Never     Passive exposure: Never    Smokeless tobacco: Never   Substance and Sexual Activity    Alcohol use: Yes     Alcohol/week: 9.0 standard drinks of alcohol     Types: 9 Standard drinks or equivalent per week     Comment: 3 times a week    Drug use: Never    Sexual activity: Not on file   Other Topics Concern    Not on file   Social History Narrative    Not on file     Social Determinants of Health     Financial Resource Strain: Not on  file   Food Insecurity: Not on file   Transportation Needs: Not on file   Physical Activity: Not on file   Stress: Not on file   Social Connections: Not on file   Intimate Partner Violence: Not on file   Housing Stability: Not on file       History reviewed. No pertinent surgical history.

## 2024-01-08 NOTE — PROGRESS NOTES
History of Present Illness   Patient returns today states unfortunately his knee and hip are not doing much better.  He has had a notable history of his left knee been pretty sore and uncomfortable for almost a year since a skiing incident.  Ever since he twisted it has been sore uncomfortable with some reproducible mechanical symptoms does get ill stiff and swollen.  Also states his left hip is fairly bothersome with repetitive activities.  Extended period of walking gets reproducible hip pain which gets pretty intense at times and locates that the lateral side of the hip.  Review of Systems   GENERAL: Negative for malaise, significant weight loss, fever  MUSCULOSKELETAL: see HPI  NEURO:  Negative     Physical Exam  General appearance well-nourished well-developed AOx3 no acute distress  Left hip: Moderate pain over the trochanter.  Positive IAN test.  Negative impingement with good motion to the hip    Left knee exam shows skin intact full active range of motion the medial joint line soreness to palpation positive Darrion's and Apley's compression grind.  Lower extremity is neurovascular tact.  Trace to 1+ effusion.  He is able flex to 110% pain in full extension.     Imaging  No acute fractures or dislocations.  No arthritic change  MRI left hip: Moderate tendinosis within the gluteal muscles with some reactive edema.  Small degenerative anterior superior labral tear  MRI of the left knee: Small complex tear medial meniscus undersurface posterior, anterior horn lateral meniscus tear    Assessment   Left knee pain, medial/lateral meniscus tear  Left trochanteric bursitis with labral tear  Plan  1.  I reviewed with the patient overall think his left hip is fairly normal in terms of pathology.  Discussed he does have a small degenerative labral tear which I do not think at this point is bothering him.  I recommended the ultrasound-guided injection to the trochanter 8 cc lidocaine of 1 of cc of Celestone.  To  treat this is predominantly trochanteric bursitis and gluteal tendinitis, issue.  I discussed that I think this will resolve with time.  We outlined all of his MRI findings    2.  Left knee: I recommended left knee arthroscopy with partial medial and lateral meniscectomy with a possible repair  Menisectomy plan of care:  Risks benefits and alternatives to surgery were discussed including but not limited to Infection, bleeding, neurovascular injury, pain and dysfunction, hardware related complications including cutout failure breakage, loss of function, motion, and permanent disability as well as the cardiovascular and pulmonary complications from anesthesia including death and DVT. Patient and family accept these risks.  We discussed specifically post meniscectomy pain, incomplete pain relief, meniscus retear, progressive arthritis, intraoperative decisions in regards to other pathology, and the potential for future revision surgeries including arthroplasty    Plan for outpatient surgery   1. 1 week postop follow up   2. Percocet for postop pain relief. OARRS has been reviewed and is consistent with prescribed medications. This report is scanned into the electronic medical record. The risks of abuse, dependence, addiction and diversion were considered. The medication is felt to be clinically appropriate based on documented diagnosis .  3.  Pre-CERT for removal postoperative knee brace    Past Medical History:   Diagnosis Date    Acute frontal sinusitis, unspecified 03/27/2018    Acute frontal sinusitis    Acute maxillary sinusitis, unspecified 12/11/2019    Subacute maxillary sinusitis    Acute tonsillitis, unspecified 08/14/2016    Acute tonsillitis    Allergic contact dermatitis due to plants, except food 10/03/2019    Poison ivy dermatitis    Chronic ethmoidal sinusitis 11/30/2021    Chronic ethmoidal sinusitis    Chronic rhinitis 02/14/2020    Chronic rhinosinusitis    Elevated blood-pressure reading, without  diagnosis of hypertension 07/30/2014    Elevated blood pressure reading without diagnosis of hypertension    Elevated blood-pressure reading, without diagnosis of hypertension 06/28/2022    Blood pressure elevated without history of HTN    Encounter for examination of ears and hearing without abnormal findings 08/07/2018    Encounter for hearing evaluation    Hesitancy of micturition 03/10/2021    Urinary hesitancy    Lyme disease, unspecified     Lyme disease    Other abnormal auditory perceptions, unspecified ear 05/23/2018    Abnormal auditory perception    Other conditions influencing health status     Pneumonia    Other conditions influencing health status 09/14/2016    Counseling About Travel    Other forms of dyspnea 04/10/2020    Dyspnea on exertion    Other specified disorders of ear, bilateral 03/30/2020    Ear fullness, bilateral    Other symptoms and signs involving general sensations and perceptions 04/21/2021    Facial pressure    Pain in left knee 08/10/2017    Left knee pain, unspecified chronicity    Pain in right hip 08/08/2019    Hip pain, right    Personal history of other diseases of the nervous system and sense organs 01/05/2016    History of acute otitis media    Personal history of other diseases of the nervous system and sense organs 05/16/2018    History of eustachian tube dysfunction    Personal history of other diseases of the nervous system and sense organs 06/21/2015    History of acute otitis media    Personal history of other diseases of the nervous system and sense organs 05/23/2018    History of eustachian tube dysfunction    Personal history of other diseases of the respiratory system 04/13/2019    History of tonsillitis    Personal history of other diseases of the respiratory system 08/14/2016    History of sore throat    Personal history of other diseases of the respiratory system 12/12/2017    History of acute bronchitis    Personal history of other diseases of the respiratory  system 04/13/2019    History of upper respiratory infection    Personal history of other diseases of the respiratory system 11/24/2014    History of sinusitis    Personal history of other diseases of the respiratory system 08/08/2018    History of chronic sinusitis    Personal history of other specified conditions 11/30/2021    History of nasal congestion    Personal history of other specified conditions 08/23/2013    History of nausea    Personal history of other specified conditions 09/26/2022    History of insomnia    Personal history of other specified conditions 04/21/2021    History of facial pain    Unspecified hearing loss, left ear 12/26/2019    Hearing loss, left       Medication Documentation Review Audit       Reviewed by Altagracia Jiménez on 01/08/24 at 1124      Medication Order Taking? Sig Documenting Provider Last Dose Status   FLUoxetine (PROzac) 10 mg capsule [id number]  Take 1 capsule (10 mg) by mouth once daily. Gerardo Nevarez MD  Active   ibuprofen 800 mg tablet [id number] No Take 1 tablet (800 mg) by mouth 3 times a day as needed. Take with food. Historical Provider, MD Not Taking Active                    Allergies   Allergen Reactions    Nitrofurantoin Monohyd/M-Cryst Nausea Only       Social History     Socioeconomic History    Marital status:      Spouse name: Not on file    Number of children: Not on file    Years of education: Not on file    Highest education level: Not on file   Occupational History    Not on file   Tobacco Use    Smoking status: Never     Passive exposure: Never    Smokeless tobacco: Never   Substance and Sexual Activity    Alcohol use: Yes     Alcohol/week: 9.0 standard drinks of alcohol     Types: 9 Standard drinks or equivalent per week     Comment: 3 times a week    Drug use: Never    Sexual activity: Not on file   Other Topics Concern    Not on file   Social History Narrative    Not on file     Social Determinants of Health     Financial Resource Strain: Not on  file   Food Insecurity: Not on file   Transportation Needs: Not on file   Physical Activity: Not on file   Stress: Not on file   Social Connections: Not on file   Intimate Partner Violence: Not on file   Housing Stability: Not on file       History reviewed. No pertinent surgical history.

## 2024-01-09 ENCOUNTER — TRANSCRIBE ORDERS (OUTPATIENT)
Dept: OPERATING ROOM | Facility: HOSPITAL | Age: 44
End: 2024-01-09
Payer: COMMERCIAL

## 2024-01-09 DIAGNOSIS — S83.242A OTHER TEAR OF MEDIAL MENISCUS, CURRENT INJURY, LEFT KNEE, INITIAL ENCOUNTER: Primary | ICD-10-CM

## 2024-01-10 ENCOUNTER — TELEPHONE (OUTPATIENT)
Dept: ORTHOPEDIC SURGERY | Facility: CLINIC | Age: 44
End: 2024-01-10
Payer: COMMERCIAL

## 2024-01-10 NOTE — TELEPHONE ENCOUNTER
01/10/24  LVM for pt re availability of crutches for post-op use following sx.  Asked him to contact our office and let us know if he has, or needs them.

## 2024-01-16 DIAGNOSIS — S83.232D COMPLEX TEAR OF MEDIAL MENISCUS OF LEFT KNEE AS CURRENT INJURY, SUBSEQUENT ENCOUNTER: Primary | ICD-10-CM

## 2024-01-16 RX ORDER — OXYCODONE AND ACETAMINOPHEN 5; 325 MG/1; MG/1
1 TABLET ORAL EVERY 4 HOURS PRN
Qty: 20 TABLET | Refills: 0 | Status: SHIPPED | OUTPATIENT
Start: 2024-01-16 | End: 2024-01-23

## 2024-01-16 NOTE — PREPROCEDURE INSTRUCTIONS
Patient and nurse discussed pre-operative instructions of the location of procedure, NPO status, home medications, and what to aspect after procedure.  Patient is aware to not smoke nicotine products, drink alcohol, or do any drugs within 24hrs of procedure.  Not to bring any valuables and to not wear any metal, jewelry, piercing's or beauty products for surgery.   Informed about the call the business day prior to procedure that will be give the exact time of arrival on the day of surgery, between 2PM-4PM.  Any questions call the surgeons office, and any questions about Pre-Admission Testing call [phone]

## 2024-01-17 ENCOUNTER — APPOINTMENT (OUTPATIENT)
Dept: CARDIOLOGY | Facility: HOSPITAL | Age: 44
End: 2024-01-17
Payer: COMMERCIAL

## 2024-01-17 ENCOUNTER — ANESTHESIA (OUTPATIENT)
Dept: OPERATING ROOM | Facility: HOSPITAL | Age: 44
End: 2024-01-17
Payer: COMMERCIAL

## 2024-01-17 ENCOUNTER — HOSPITAL ENCOUNTER (OUTPATIENT)
Facility: HOSPITAL | Age: 44
Setting detail: OUTPATIENT SURGERY
Discharge: HOME | End: 2024-01-17
Attending: ORTHOPAEDIC SURGERY | Admitting: ORTHOPAEDIC SURGERY
Payer: COMMERCIAL

## 2024-01-17 ENCOUNTER — ANESTHESIA EVENT (OUTPATIENT)
Dept: OPERATING ROOM | Facility: HOSPITAL | Age: 44
End: 2024-01-17
Payer: COMMERCIAL

## 2024-01-17 VITALS
HEIGHT: 71 IN | SYSTOLIC BLOOD PRESSURE: 125 MMHG | WEIGHT: 200.4 LBS | BODY MASS INDEX: 28.06 KG/M2 | DIASTOLIC BLOOD PRESSURE: 85 MMHG | HEART RATE: 56 BPM | OXYGEN SATURATION: 96 % | RESPIRATION RATE: 16 BRPM | TEMPERATURE: 96.8 F

## 2024-01-17 DIAGNOSIS — S83.242A OTHER TEAR OF MEDIAL MENISCUS, CURRENT INJURY, LEFT KNEE, INITIAL ENCOUNTER: Primary | ICD-10-CM

## 2024-01-17 LAB
ALBUMIN SERPL BCP-MCNC: 4.3 G/DL (ref 3.4–5)
ALP SERPL-CCNC: 77 U/L (ref 33–120)
ALT SERPL W P-5'-P-CCNC: 18 U/L (ref 10–52)
ANION GAP SERPL CALC-SCNC: 11 MMOL/L (ref 10–20)
APTT PPP: 33 SECONDS (ref 27–38)
AST SERPL W P-5'-P-CCNC: 19 U/L (ref 9–39)
ATRIAL RATE: 55 BPM
BASOPHILS # BLD AUTO: 0.03 X10*3/UL (ref 0–0.1)
BASOPHILS NFR BLD AUTO: 0.5 %
BILIRUB SERPL-MCNC: 0.6 MG/DL (ref 0–1.2)
BUN SERPL-MCNC: 20 MG/DL (ref 6–23)
CALCIUM SERPL-MCNC: 9 MG/DL (ref 8.6–10.3)
CHLORIDE SERPL-SCNC: 105 MMOL/L (ref 98–107)
CO2 SERPL-SCNC: 27 MMOL/L (ref 21–32)
CREAT SERPL-MCNC: 1.3 MG/DL (ref 0.5–1.3)
EGFRCR SERPLBLD CKD-EPI 2021: 69 ML/MIN/1.73M*2
EOSINOPHIL # BLD AUTO: 0.11 X10*3/UL (ref 0–0.7)
EOSINOPHIL NFR BLD AUTO: 1.8 %
ERYTHROCYTE [DISTWIDTH] IN BLOOD BY AUTOMATED COUNT: 13.4 % (ref 11.5–14.5)
GLUCOSE SERPL-MCNC: 84 MG/DL (ref 74–99)
HCT VFR BLD AUTO: 42.5 % (ref 41–52)
HGB BLD-MCNC: 14.6 G/DL (ref 13.5–17.5)
IMM GRANULOCYTES # BLD AUTO: 0.02 X10*3/UL (ref 0–0.7)
IMM GRANULOCYTES NFR BLD AUTO: 0.3 % (ref 0–0.9)
INR PPP: 1 (ref 0.9–1.1)
LYMPHOCYTES # BLD AUTO: 2.58 X10*3/UL (ref 1.2–4.8)
LYMPHOCYTES NFR BLD AUTO: 42 %
MCH RBC QN AUTO: 29.7 PG (ref 26–34)
MCHC RBC AUTO-ENTMCNC: 34.4 G/DL (ref 32–36)
MCV RBC AUTO: 87 FL (ref 80–100)
MONOCYTES # BLD AUTO: 0.44 X10*3/UL (ref 0.1–1)
MONOCYTES NFR BLD AUTO: 7.2 %
NEUTROPHILS # BLD AUTO: 2.96 X10*3/UL (ref 1.2–7.7)
NEUTROPHILS NFR BLD AUTO: 48.2 %
NRBC BLD-RTO: 0 /100 WBCS (ref 0–0)
P AXIS: 17 DEGREES
P OFFSET: 190 MS
P ONSET: 141 MS
PLATELET # BLD AUTO: 266 X10*3/UL (ref 150–450)
POTASSIUM SERPL-SCNC: 4 MMOL/L (ref 3.5–5.3)
PR INTERVAL: 156 MS
PROT SERPL-MCNC: 7.1 G/DL (ref 6.4–8.2)
PROTHROMBIN TIME: 11.8 SECONDS (ref 9.8–12.8)
Q ONSET: 219 MS
QRS COUNT: 10 BEATS
QRS DURATION: 86 MS
QT INTERVAL: 432 MS
QTC CALCULATION(BAZETT): 413 MS
QTC FREDERICIA: 419 MS
R AXIS: 31 DEGREES
RBC # BLD AUTO: 4.91 X10*6/UL (ref 4.5–5.9)
SODIUM SERPL-SCNC: 139 MMOL/L (ref 136–145)
T AXIS: 29 DEGREES
T OFFSET: 435 MS
VENTRICULAR RATE: 55 BPM
WBC # BLD AUTO: 6.1 X10*3/UL (ref 4.4–11.3)

## 2024-01-17 PROCEDURE — 7100000002 HC RECOVERY ROOM TIME - EACH INCREMENTAL 1 MINUTE: Performed by: ORTHOPAEDIC SURGERY

## 2024-01-17 PROCEDURE — 2500000001 HC RX 250 WO HCPCS SELF ADMINISTERED DRUGS (ALT 637 FOR MEDICARE OP): Performed by: STUDENT IN AN ORGANIZED HEALTH CARE EDUCATION/TRAINING PROGRAM

## 2024-01-17 PROCEDURE — 7100000010 HC PHASE TWO TIME - EACH INCREMENTAL 1 MINUTE: Performed by: ORTHOPAEDIC SURGERY

## 2024-01-17 PROCEDURE — 29881 ARTHRS KNE SRG MNISECTMY M/L: CPT | Performed by: ORTHOPAEDIC SURGERY

## 2024-01-17 PROCEDURE — 2500000004 HC RX 250 GENERAL PHARMACY W/ HCPCS (ALT 636 FOR OP/ED): Performed by: STUDENT IN AN ORGANIZED HEALTH CARE EDUCATION/TRAINING PROGRAM

## 2024-01-17 PROCEDURE — 2500000004 HC RX 250 GENERAL PHARMACY W/ HCPCS (ALT 636 FOR OP/ED): Performed by: ORTHOPAEDIC SURGERY

## 2024-01-17 PROCEDURE — 3700000002 HC GENERAL ANESTHESIA TIME - EACH INCREMENTAL 1 MINUTE: Performed by: ORTHOPAEDIC SURGERY

## 2024-01-17 PROCEDURE — 3600000009 HC OR TIME - EACH INCREMENTAL 1 MINUTE - PROCEDURE LEVEL FOUR: Performed by: ORTHOPAEDIC SURGERY

## 2024-01-17 PROCEDURE — 80053 COMPREHEN METABOLIC PANEL: CPT | Performed by: ORTHOPAEDIC SURGERY

## 2024-01-17 PROCEDURE — 3700000001 HC GENERAL ANESTHESIA TIME - INITIAL BASE CHARGE: Performed by: ORTHOPAEDIC SURGERY

## 2024-01-17 PROCEDURE — 93010 ELECTROCARDIOGRAM REPORT: CPT | Performed by: INTERNAL MEDICINE

## 2024-01-17 PROCEDURE — 85610 PROTHROMBIN TIME: CPT | Performed by: ORTHOPAEDIC SURGERY

## 2024-01-17 PROCEDURE — 36415 COLL VENOUS BLD VENIPUNCTURE: CPT | Performed by: ORTHOPAEDIC SURGERY

## 2024-01-17 PROCEDURE — 7100000009 HC PHASE TWO TIME - INITIAL BASE CHARGE: Performed by: ORTHOPAEDIC SURGERY

## 2024-01-17 PROCEDURE — 2500000005 HC RX 250 GENERAL PHARMACY W/O HCPCS: Performed by: ORTHOPAEDIC SURGERY

## 2024-01-17 PROCEDURE — A4217 STERILE WATER/SALINE, 500 ML: HCPCS | Performed by: ORTHOPAEDIC SURGERY

## 2024-01-17 PROCEDURE — 85025 COMPLETE CBC W/AUTO DIFF WBC: CPT | Performed by: ORTHOPAEDIC SURGERY

## 2024-01-17 PROCEDURE — 7100000001 HC RECOVERY ROOM TIME - INITIAL BASE CHARGE: Performed by: ORTHOPAEDIC SURGERY

## 2024-01-17 PROCEDURE — 2500000005 HC RX 250 GENERAL PHARMACY W/O HCPCS: Performed by: STUDENT IN AN ORGANIZED HEALTH CARE EDUCATION/TRAINING PROGRAM

## 2024-01-17 PROCEDURE — 3600000004 HC OR TIME - INITIAL BASE CHARGE - PROCEDURE LEVEL FOUR: Performed by: ORTHOPAEDIC SURGERY

## 2024-01-17 PROCEDURE — 2720000007 HC OR 272 NO HCPCS: Performed by: ORTHOPAEDIC SURGERY

## 2024-01-17 PROCEDURE — 93005 ELECTROCARDIOGRAM TRACING: CPT | Mod: 59

## 2024-01-17 RX ORDER — SODIUM CHLORIDE, SODIUM LACTATE, POTASSIUM CHLORIDE, CALCIUM CHLORIDE 600; 310; 30; 20 MG/100ML; MG/100ML; MG/100ML; MG/100ML
100 INJECTION, SOLUTION INTRAVENOUS CONTINUOUS
Status: DISCONTINUED | OUTPATIENT
Start: 2024-01-17 | End: 2024-01-17 | Stop reason: HOSPADM

## 2024-01-17 RX ORDER — DIPHENHYDRAMINE HYDROCHLORIDE 50 MG/ML
12.5 INJECTION INTRAMUSCULAR; INTRAVENOUS ONCE AS NEEDED
Status: DISCONTINUED | OUTPATIENT
Start: 2024-01-17 | End: 2024-01-17 | Stop reason: HOSPADM

## 2024-01-17 RX ORDER — FAMOTIDINE 10 MG/ML
INJECTION INTRAVENOUS AS NEEDED
Status: DISCONTINUED | OUTPATIENT
Start: 2024-01-17 | End: 2024-01-17

## 2024-01-17 RX ORDER — ONDANSETRON HYDROCHLORIDE 2 MG/ML
4 INJECTION, SOLUTION INTRAVENOUS ONCE AS NEEDED
Status: DISCONTINUED | OUTPATIENT
Start: 2024-01-17 | End: 2024-01-17 | Stop reason: HOSPADM

## 2024-01-17 RX ORDER — ALBUTEROL SULFATE 0.83 MG/ML
2.5 SOLUTION RESPIRATORY (INHALATION) ONCE AS NEEDED
Status: DISCONTINUED | OUTPATIENT
Start: 2024-01-17 | End: 2024-01-17 | Stop reason: HOSPADM

## 2024-01-17 RX ORDER — DROPERIDOL 2.5 MG/ML
0.62 INJECTION, SOLUTION INTRAMUSCULAR; INTRAVENOUS ONCE
Status: COMPLETED | OUTPATIENT
Start: 2024-01-17 | End: 2024-01-17

## 2024-01-17 RX ORDER — SODIUM CHLORIDE 9 MG/ML
100 INJECTION, SOLUTION INTRAVENOUS CONTINUOUS
Status: DISCONTINUED | OUTPATIENT
Start: 2024-01-17 | End: 2024-01-17 | Stop reason: HOSPADM

## 2024-01-17 RX ORDER — OXYCODONE HYDROCHLORIDE 5 MG/1
5 TABLET ORAL EVERY 4 HOURS PRN
Status: CANCELLED | OUTPATIENT
Start: 2024-01-17

## 2024-01-17 RX ORDER — FENTANYL CITRATE 50 UG/ML
25 INJECTION, SOLUTION INTRAMUSCULAR; INTRAVENOUS EVERY 5 MIN PRN
Status: DISCONTINUED | OUTPATIENT
Start: 2024-01-17 | End: 2024-01-17 | Stop reason: HOSPADM

## 2024-01-17 RX ORDER — LIDOCAINE HYDROCHLORIDE 20 MG/ML
INJECTION, SOLUTION EPIDURAL; INFILTRATION; INTRACAUDAL; PERINEURAL AS NEEDED
Status: DISCONTINUED | OUTPATIENT
Start: 2024-01-17 | End: 2024-01-17

## 2024-01-17 RX ORDER — MIDAZOLAM HYDROCHLORIDE 1 MG/ML
INJECTION, SOLUTION INTRAMUSCULAR; INTRAVENOUS AS NEEDED
Status: DISCONTINUED | OUTPATIENT
Start: 2024-01-17 | End: 2024-01-17

## 2024-01-17 RX ORDER — ONDANSETRON HYDROCHLORIDE 2 MG/ML
INJECTION, SOLUTION INTRAVENOUS AS NEEDED
Status: DISCONTINUED | OUTPATIENT
Start: 2024-01-17 | End: 2024-01-17

## 2024-01-17 RX ORDER — CEFAZOLIN SODIUM 2 G/100ML
2 INJECTION, SOLUTION INTRAVENOUS EVERY 8 HOURS
Status: DISCONTINUED | OUTPATIENT
Start: 2024-01-17 | End: 2024-01-17 | Stop reason: HOSPADM

## 2024-01-17 RX ORDER — BUPIVACAINE HCL/EPINEPHRINE 0.25-.0005
VIAL (ML) INJECTION AS NEEDED
Status: DISCONTINUED | OUTPATIENT
Start: 2024-01-17 | End: 2024-01-17 | Stop reason: HOSPADM

## 2024-01-17 RX ORDER — DEXAMETHASONE SODIUM PHOSPHATE 4 MG/ML
INJECTION, SOLUTION INTRA-ARTICULAR; INTRALESIONAL; INTRAMUSCULAR; INTRAVENOUS; SOFT TISSUE AS NEEDED
Status: DISCONTINUED | OUTPATIENT
Start: 2024-01-17 | End: 2024-01-17

## 2024-01-17 RX ORDER — SODIUM CHLORIDE 0.9 G/100ML
IRRIGANT IRRIGATION AS NEEDED
Status: DISCONTINUED | OUTPATIENT
Start: 2024-01-17 | End: 2024-01-17 | Stop reason: HOSPADM

## 2024-01-17 RX ORDER — LABETALOL HYDROCHLORIDE 5 MG/ML
5 INJECTION, SOLUTION INTRAVENOUS ONCE AS NEEDED
Status: DISCONTINUED | OUTPATIENT
Start: 2024-01-17 | End: 2024-01-17 | Stop reason: HOSPADM

## 2024-01-17 RX ORDER — PROPOFOL 10 MG/ML
INJECTION, EMULSION INTRAVENOUS AS NEEDED
Status: DISCONTINUED | OUTPATIENT
Start: 2024-01-17 | End: 2024-01-17

## 2024-01-17 RX ORDER — FENTANYL CITRATE 50 UG/ML
INJECTION, SOLUTION INTRAMUSCULAR; INTRAVENOUS AS NEEDED
Status: DISCONTINUED | OUTPATIENT
Start: 2024-01-17 | End: 2024-01-17

## 2024-01-17 RX ORDER — OXYCODONE HYDROCHLORIDE 5 MG/1
10 TABLET ORAL EVERY 4 HOURS PRN
Status: DISCONTINUED | OUTPATIENT
Start: 2024-01-17 | End: 2024-01-17 | Stop reason: HOSPADM

## 2024-01-17 RX ORDER — HYDRALAZINE HYDROCHLORIDE 20 MG/ML
5 INJECTION INTRAMUSCULAR; INTRAVENOUS EVERY 30 MIN PRN
Status: DISCONTINUED | OUTPATIENT
Start: 2024-01-17 | End: 2024-01-17 | Stop reason: HOSPADM

## 2024-01-17 RX ADMIN — FAMOTIDINE 20 MG: 10 INJECTION, SOLUTION INTRAVENOUS at 09:14

## 2024-01-17 RX ADMIN — ONDANSETRON 4 MG: 2 INJECTION, SOLUTION INTRAMUSCULAR; INTRAVENOUS at 09:14

## 2024-01-17 RX ADMIN — HYDROMORPHONE HYDROCHLORIDE 0.5 MG: 1 INJECTION, SOLUTION INTRAMUSCULAR; INTRAVENOUS; SUBCUTANEOUS at 10:06

## 2024-01-17 RX ADMIN — LIDOCAINE HYDROCHLORIDE 80 MG: 20 INJECTION, SOLUTION EPIDURAL; INFILTRATION; INTRACAUDAL; PERINEURAL at 09:01

## 2024-01-17 RX ADMIN — MIDAZOLAM 2 MG: 1 INJECTION INTRAMUSCULAR; INTRAVENOUS at 08:55

## 2024-01-17 RX ADMIN — HYDROMORPHONE HYDROCHLORIDE 0.5 MG: 1 INJECTION, SOLUTION INTRAMUSCULAR; INTRAVENOUS; SUBCUTANEOUS at 10:00

## 2024-01-17 RX ADMIN — DEXAMETHASONE SODIUM PHOSPHATE 4 MG: 4 INJECTION, SOLUTION INTRAMUSCULAR; INTRAVENOUS at 09:12

## 2024-01-17 RX ADMIN — OXYCODONE HYDROCHLORIDE 10 MG: 5 TABLET ORAL at 10:22

## 2024-01-17 RX ADMIN — CEFAZOLIN SODIUM 2 G: 2 INJECTION, SOLUTION INTRAVENOUS at 09:05

## 2024-01-17 RX ADMIN — HYDROMORPHONE HYDROCHLORIDE 0.5 MG: 1 INJECTION, SOLUTION INTRAMUSCULAR; INTRAVENOUS; SUBCUTANEOUS at 10:11

## 2024-01-17 RX ADMIN — SODIUM CHLORIDE 100 ML/HR: 9 INJECTION, SOLUTION INTRAVENOUS at 08:33

## 2024-01-17 RX ADMIN — HYDROMORPHONE HYDROCHLORIDE 0.5 MG: 1 INJECTION, SOLUTION INTRAMUSCULAR; INTRAVENOUS; SUBCUTANEOUS at 10:17

## 2024-01-17 RX ADMIN — FENTANYL CITRATE 50 MCG: 50 INJECTION, SOLUTION INTRAMUSCULAR; INTRAVENOUS at 09:55

## 2024-01-17 RX ADMIN — FENTANYL CITRATE 50 MCG: 50 INJECTION, SOLUTION INTRAMUSCULAR; INTRAVENOUS at 09:00

## 2024-01-17 RX ADMIN — PROPOFOL 200 MG: 10 INJECTION, EMULSION INTRAVENOUS at 09:02

## 2024-01-17 RX ADMIN — SODIUM CHLORIDE, POTASSIUM CHLORIDE, SODIUM LACTATE AND CALCIUM CHLORIDE 100 ML/HR: 600; 310; 30; 20 INJECTION, SOLUTION INTRAVENOUS at 10:23

## 2024-01-17 RX ADMIN — DROPERIDOL 0.62 MG: 2.5 INJECTION, SOLUTION INTRAMUSCULAR; INTRAVENOUS at 12:59

## 2024-01-17 SDOH — HEALTH STABILITY: MENTAL HEALTH: CURRENT SMOKER: 0

## 2024-01-17 ASSESSMENT — PAIN - FUNCTIONAL ASSESSMENT
PAIN_FUNCTIONAL_ASSESSMENT: 0-10

## 2024-01-17 ASSESSMENT — PAIN SCALES - GENERAL
PAINLEVEL_OUTOF10: 5 - MODERATE PAIN
PAINLEVEL_OUTOF10: 7
PAINLEVEL_OUTOF10: 3
PAINLEVEL_OUTOF10: 4
PAINLEVEL_OUTOF10: 4
PAINLEVEL_OUTOF10: 0 - NO PAIN
PAINLEVEL_OUTOF10: 6
PAIN_LEVEL: 6

## 2024-01-17 ASSESSMENT — PAIN DESCRIPTION - DESCRIPTORS
DESCRIPTORS: ACHING

## 2024-01-17 ASSESSMENT — COLUMBIA-SUICIDE SEVERITY RATING SCALE - C-SSRS
1. IN THE PAST MONTH, HAVE YOU WISHED YOU WERE DEAD OR WISHED YOU COULD GO TO SLEEP AND NOT WAKE UP?: NO
2. HAVE YOU ACTUALLY HAD ANY THOUGHTS OF KILLING YOURSELF?: NO
6. HAVE YOU EVER DONE ANYTHING, STARTED TO DO ANYTHING, OR PREPARED TO DO ANYTHING TO END YOUR LIFE?: NO

## 2024-01-17 NOTE — DISCHARGE INSTRUCTIONS
General Anesthesia Discharge Instructions    About this topic  You may need general anesthesia if you need to be asleep during a procedure. Your doctor will use drugs to block the signals that go from your nerves to your brain. Doctors give general anesthesia during a surgery or procedure to:  Allow you to sleep  Help your body be still  Relax your muscles  Help you to relax and be pain free  Keep you from remembering the surgery  Let the doctor manage your airway, breathing, and blood flow  The doctor or nurse anesthetist gives general anesthesia by a shot into your vein. Sometimes, you may breathe in a gas through a mask placed over your face.  What care is needed at home?  Ask your doctor what you need to do when you go home. Make sure you ask questions if you do not understand what the doctor says.  Your doctor may give you drugs to prevent or treat an upset stomach from the anesthetic. Take them as ordered.  If your throat is sore, suck on ice chips or popsicles to ease throat pain.  Put 2 to 3 pillows under your head and back when you lie down to help you breathe easier.  For the first 24 to 48 hours:  Do not operate heavy or dangerous machinery.  Do not make major decisions or sign important papers. You may not be able to think clearly.  Avoid beer, wine, or mixed drinks.  You are at a higher risk of falling for at least 24 hours after general anesthesia.  Take extra care when you get up.  Do not change positions quickly.  Do not rush when you need to go to the bathroom or to answer the phone.  Ask for help if you feel unsteady when you try to walk.  Wear shoes with non-slip soles and low heels.  What follow-up care is needed?  Your doctor may ask you to come back to the office to check on your progress. Be sure to keep these visits.  If you have stitches that do not dissolve or staples, you will need to have them removed. Your doctor will want to do this in 1 to 2 weeks. If the doctor used skin glue, the  glue will fall off on its own.  What drugs may be needed?  The doctor may order drugs to:  Help with pain  Treat an upset stomach or throwing up  Will physical activity be limited?  You will not be allowed to drive right away after the procedure. Ask a family member or a friend to drive you home.  Avoid trying to get out of bed without help until you are sure of your balance.  You may have to limit your activity. Talk to your doctor about if you need to limit how much you lift or limit exercise after your procedure.  What changes to diet are needed?  Start with a light diet when you are fully awake. This includes things that are easy to swallow like soups, pudding, jello, toast, and eggs. Slowly progress to your normal diet.  What problems could happen?  Low blood pressure  Breathing problems  Upset stomach or throwing up  Dizziness  Blood clots  Infection  When do I need to call the doctor?  Trouble breathing  Upset stomach or throwing up more than 3 times in the next 2 days  Dizziness  Teach Back: Helping You Understand  The Teach Back Method helps you understand the information we are giving you. After you talk with the staff, tell them in your own words what you learned. This helps to make sure the staff has described each thing clearly. It also helps to explain things that may have been confusing. Before going home, make sure you can do these:  I can tell you about my procedure.  I can tell you if I need to follow up with my doctor.  I can tell you what is good for me to eat and drink the next day.  I can tell you what I would do if I have trouble breathing, an upset stomach, or dizziness.  Where can I learn more?  [employer] [place] [employer]  https://www.nigms.nih.gov/education/pages/factsheet_Anesthesia.aspx  N[facility] Choices  http://www.nhs.uk/conditions/Anaesthetic-general/Pages/Definition.aspx  Last Reviewed Date  2020-04-22

## 2024-01-17 NOTE — ANESTHESIA PREPROCEDURE EVALUATION
Johnny Henry is a 44 y.o. male here for:    ARTHROSCOPY MEDIAL/LATERAL MENISCECTOMY POSSIBLE MENISCUS REPAIR  With Jules Gonzalez MD  Pre-Op Diagnosis Codes:     * Other tear of medial meniscus, current injury, left knee, initial encounter [S83.242A]    Relevant Problems   Cardiovascular   (+) Dyslipidemia, goal LDL below 100      Neuro/Psych   (+) Anxiety   (+) Panic attacks   (+) Situational depression      Pulmonary   (+) Dyspnea on exertion      Eyes, Ears, Nose, and Throat   (+) Hearing loss, left       Lab Results   Component Value Date    HGB 14.8 08/22/2023    HCT 45.4 08/22/2023    WBC 6.0 08/22/2023     08/22/2023     08/22/2023    K 4.4 08/22/2023     08/22/2023    CREATININE 1.16 08/22/2023    BUN 17 08/22/2023       Social History     Tobacco Use   Smoking Status Never    Passive exposure: Never   Smokeless Tobacco Never       No Known Allergies    Current Outpatient Medications   Medication Instructions    FLUoxetine (PROZAC) 10 mg, oral, Daily    ibuprofen 800 mg, oral, 3 times daily PRN, Take with food.    oxyCODONE-acetaminophen (Percocet) 5-325 mg tablet 1 tablet, oral, Every 4 hours PRN       Past Surgical History:   Procedure Laterality Date    LASIK      WISDOM TOOTH EXTRACTION         No family history on file.    NPO Details:  No data recorded    Physical Exam    Airway  Mallampati: II  TM distance: >3 FB  Neck ROM: full     Cardiovascular - normal exam     Dental - normal exam     Pulmonary - normal exam     Abdominal            Anesthesia Plan    History of general anesthesia?: no  History of complications of general anesthesia?: no    ASA 2     general     The patient is not a current smoker.    intravenous induction   Postoperative administration of opioids is intended.  Trial extubation is planned.  Anesthetic plan and risks discussed with patient.

## 2024-01-17 NOTE — ANESTHESIA PROCEDURE NOTES
Airway  Date/Time: 1/17/2024 9:03 AM  Urgency: elective    Airway not difficult    Staffing  Performed: attending   Authorized by: Andrea Segura DO    Performed by: Andrea Segura DO  Patient location during procedure: OR    Indications and Patient Condition  Indications for airway management: anesthesia  Spontaneous Ventilation: absent  Sedation level: deep  Preoxygenated: yes  Patient position: sniffing  Mask difficulty assessment: 0 - not attempted  Planned trial extubation    Final Airway Details  Final airway type: supraglottic airway      Successful airway: classic  Size 4     Number of attempts at approach: 1  Number of other approaches attempted: 0    Additional Comments  1 attempt, atraumatic

## 2024-01-17 NOTE — OP NOTE
ARTHROSCOPY MEDIAL/LATERAL MENISCECTOMY (L) Operative Note     Date: 2024  OR Location: [facility]    Name: Johnny eHnry : 1980, Age: 44 y.o., MRN: [id number], Sex: male    Diagnosis  Pre-op Diagnosis     * Other tear of medial meniscus, current injury, left knee, initial encounter [S83.242A] Post-op Diagnosis     * Other tear of medial meniscus, current injury, left knee, initial encounter [S83.242A]     Procedures  ARTHROSCOPY MEDIAL/LATERAL MENISCECTOMY  11905 - PA ARTHRS KNEE W/MENISCECTOMY MED&LAT W/SHAVING  Left knee arthroscopy with partial medial meniscectomy 15%    Surgeons      * Jules Gonzalez - Primary    Resident/Fellow/Other Assistant:  Surgeon(s) and Role: Екатерина VEE   Procedure Summary  Anesthesia: General  ASA: II  Anesthesia Staff: Anesthesiologist: Andrea Segura DO  Estimated Blood Loss: Minimal mL  Intra-op Medications:   Medication Name Total Dose   sodium chloride 0.9 % irrigation solution 3,000 mL   BUPivacaine-EPINEPHrine (Marcaine w/EPI) 0.25 %-1:200,000 injection 30 mL              Anesthesia Record               Intraprocedure I/O Totals          Intake    ceFAZolin in dextrose (iso-os) (Ancef) IVPB 2 g 100.00 mL    Total Intake 100 mL          Specimen: No specimens collected     Staff:   Circulator: Gabby Cerda RN  Scrub Person: Sandra Mcneil         Drains and/or Catheters: * None in log *    Tourniquet Times:   * Missing tourniquet times found for documented tourniquets in lo *     Implants:   Indications: 44-year-old male who injured his left knee resulting in a meniscus tear electing to proceed with surgery for left knee arthroscopy meniscectomy versus repair    Risks benefits and alternatives to surgery were discussed including but not limited to Infection, bleeding, neurovascular injury, pain and dysfunction, hardware related complications including cutout failure breakage, loss of function, motion, and permanent disability as well as the cardiovascular and  pulmonary complications from anesthesia including death and DVT. Patient and family accept these risks.  We discussed specifically post meniscectomy pain, incomplete pain relief, meniscus retear, progressive arthritis, intraoperative decisions in regards to other pathology, and the potential for future revision surgeries including arthroplasty    Patient identified in the preop area.  Left lower extremity marked and confirmed with patient as the operative site.  Brought back to the operating room and anesthetized under general anesthesia.  Operative lower extremity was then prepped and draped in standard sterile fashion.  Patient, site and procedure were confirmed with timeout.  Everyone in the room agreed.  Preop antibiotics were given.    We then made standard medial and lateral arthroscopy portal and the scope was introduced.  Medial joint space : Patient undersurface tear of the medial meniscus right at the mid body.  A small flap which was anterior and posterior respected.  We did about 50% total meniscal volume for meniscectomy.  Resected the undersurface cleavage and medial lateral viewing.  Back to a stable base.  Had excellent cartilage in the medial side without loss  ACL and notch were normal  Lateral joint space: Normal meniscus and cartilage.  We did a probe and visualize anterior horn lateral meniscus.  Based on MRI imaging he did have a small undersurface tear.  Was not able to visualize not appear to be loose or freely mobile.  No additional meniscectomy was required  Patellofemoral joint space: Normal patellofemoral cartilage and mechanics    Wounds were irrigated with normal saline.  Portals were closed with standard fashion.  Sterile dressings were applied.  Patient was then awoken from general anesthesia and sent to the PACU in stable condition.    Surgical Assistant was present throughout the entire case. Given the nature of the disease process and the procedure, a skilled surgical first  assistant was necessary during the case. The assistant was necessary to hold retractors and to manipulate the extremity during the procedure. A certified scrub tech was at the back table managing the instruments and supplies for the surgical case.        Jules Gonzalez  Phone Number: [phone]

## 2024-01-17 NOTE — ANESTHESIA POSTPROCEDURE EVALUATION
Patient: Johnny Henry    Procedure Summary       Date: 01/17/24 Room / Location: [facility] / [facility]    Anesthesia Start: 0856 Anesthesia Stop: 0955    Procedure: ARTHROSCOPY MEDIAL/LATERAL MENISCECTOMY (Left: Knee) Diagnosis:       Other tear of medial meniscus, current injury, left knee, initial encounter      (Other tear of medial meniscus, current injury, left knee, initial encounter [S83.242A])    Surgeons: Jules Gonzalez MD Responsible Provider: Andrea Segura DO    Anesthesia Type: general ASA Status: 2            Anesthesia Type: general    Vitals Value Taken Time   /70 01/17/24 0956   Temp 36.2 01/17/24 0956   Pulse 56 01/17/24 0954   Resp 12 01/17/24 0954   SpO2 100 % 01/17/24 0954   Vitals shown include unvalidated device data.    Anesthesia Post Evaluation    Patient location during evaluation: PACU  Patient participation: complete - patient participated  Level of consciousness: sleepy but conscious  Pain score: 6  Pain management: inadequate  Multimodal analgesia pain management approach  Airway patency: patent  Two or more strategies used to mitigate risk of obstructive sleep apnea  Cardiovascular status: acceptable and hemodynamically stable  Respiratory status: acceptable, nonlabored ventilation, spontaneous ventilation and face mask  Hydration status: acceptable  Postoperative Nausea and Vomiting: none        No notable events documented.

## 2024-01-25 ENCOUNTER — OFFICE VISIT (OUTPATIENT)
Dept: ORTHOPEDIC SURGERY | Facility: CLINIC | Age: 44
End: 2024-01-25
Payer: COMMERCIAL

## 2024-01-25 DIAGNOSIS — S83.232D COMPLEX TEAR OF MEDIAL MENISCUS OF LEFT KNEE AS CURRENT INJURY, SUBSEQUENT ENCOUNTER: Primary | ICD-10-CM

## 2024-01-25 PROCEDURE — 3008F BODY MASS INDEX DOCD: CPT | Performed by: ORTHOPAEDIC SURGERY

## 2024-01-25 PROCEDURE — 99024 POSTOP FOLLOW-UP VISIT: CPT | Performed by: ORTHOPAEDIC SURGERY

## 2024-01-25 PROCEDURE — 1036F TOBACCO NON-USER: CPT | Performed by: ORTHOPAEDIC SURGERY

## 2024-01-25 NOTE — PROGRESS NOTES
History of Present Illness  Patient returns today noting minimal pain.  Denies any calf pain or shortness of breath.     Exam  Mild effusion  Healthy incisions - no active drainage  Good range of motion  No calf swelling  Negative Alfred´s test  Distal neurovascular exam intact     Operative Findings  Partial medial meniscectomy 15%     Assessment  Patient status post left knee arthroscopy partial medial meniscectomy      Plan  Reviewed arthroscopic photos and findings.  Discussed short and long term implications for the knee.  Discussed analgesics, ice, rest.  Encouraged home exercise program, physical therapy.   He is going to get a cortisone injection under ultrasound in his tributary bursitis/gluteal tendinitis.  Will see him back in 4 weeks

## 2024-01-29 ENCOUNTER — OFFICE VISIT (OUTPATIENT)
Dept: ORTHOPEDIC SURGERY | Facility: CLINIC | Age: 44
End: 2024-01-29
Payer: COMMERCIAL

## 2024-01-29 DIAGNOSIS — M25.552 LEFT HIP PAIN: ICD-10-CM

## 2024-01-29 PROCEDURE — 99213 OFFICE O/P EST LOW 20 MIN: CPT | Performed by: FAMILY MEDICINE

## 2024-01-29 PROCEDURE — 3008F BODY MASS INDEX DOCD: CPT | Performed by: FAMILY MEDICINE

## 2024-01-29 PROCEDURE — 20611 DRAIN/INJ JOINT/BURSA W/US: CPT | Performed by: FAMILY MEDICINE

## 2024-01-29 PROCEDURE — 1036F TOBACCO NON-USER: CPT | Performed by: FAMILY MEDICINE

## 2024-01-29 RX ORDER — BETAMETHASONE SODIUM PHOSPHATE AND BETAMETHASONE ACETATE 3; 3 MG/ML; MG/ML
6 INJECTION, SUSPENSION INTRA-ARTICULAR; INTRALESIONAL; INTRAMUSCULAR; SOFT TISSUE
Status: COMPLETED | OUTPATIENT
Start: 2024-01-29 | End: 2024-01-29

## 2024-01-29 RX ORDER — LIDOCAINE HYDROCHLORIDE 10 MG/ML
8 INJECTION INFILTRATION; PERINEURAL
Status: COMPLETED | OUTPATIENT
Start: 2024-01-29 | End: 2024-01-29

## 2024-01-29 RX ADMIN — LIDOCAINE HYDROCHLORIDE 8 ML: 10 INJECTION INFILTRATION; PERINEURAL at 15:14

## 2024-01-29 RX ADMIN — BETAMETHASONE SODIUM PHOSPHATE AND BETAMETHASONE ACETATE 6 MG: 3; 3 INJECTION, SUSPENSION INTRA-ARTICULAR; INTRALESIONAL; INTRAMUSCULAR; SOFT TISSUE at 15:14

## 2024-01-29 NOTE — PROGRESS NOTES
Acute Injury New Patient Visit    CC:   Chief Complaint   Patient presents with    Right Hip - Injections     Pt presents here today for a usg anthony inj to the Rt hip per DM.           HPI: Johnny is a 44 y.o.male who presents today with new complaints of Dr. Jules Gonzalez for ultrasound-guided right greater trochanter bursa injection.        Review of Systems   GENERAL: Negative for malaise, significant weight loss, fever  MUSCULOSKELETAL: See HPI  NEURO: Negative for numbness / tingling     Past Medical History  Past Medical History:   Diagnosis Date    Acute frontal sinusitis, unspecified 03/27/2018    Acute frontal sinusitis    Acute maxillary sinusitis, unspecified 12/11/2019    Subacute maxillary sinusitis    Acute tonsillitis, unspecified 08/14/2016    Acute tonsillitis    Allergic contact dermatitis due to plants, except food 10/03/2019    Poison ivy dermatitis    Chronic ethmoidal sinusitis 11/30/2021    Chronic ethmoidal sinusitis    Chronic rhinitis 02/14/2020    Chronic rhinosinusitis    Depression     Elevated blood-pressure reading, without diagnosis of hypertension 07/30/2014    Elevated blood pressure reading without diagnosis of hypertension    Elevated blood-pressure reading, without diagnosis of hypertension 06/28/2022    Blood pressure elevated without history of HTN    Encounter for examination of ears and hearing without abnormal findings 08/07/2018    Encounter for hearing evaluation    Hesitancy of micturition 03/10/2021    Urinary hesitancy    Lyme disease, unspecified     Lyme disease    Other abnormal auditory perceptions, unspecified ear 05/23/2018    Abnormal auditory perception    Other conditions influencing health status     Pneumonia    Other conditions influencing health status 09/14/2016    Counseling About Travel    Other specified disorders of ear, bilateral 03/30/2020    Ear fullness, bilateral    Other symptoms and signs involving general sensations and perceptions 04/21/2021     Facial pressure    Pain in left knee 08/10/2017    Left knee pain, unspecified chronicity    Pain in right hip 08/08/2019    Hip pain, right    Personal history of other diseases of the nervous system and sense organs 01/05/2016    History of acute otitis media    Personal history of other diseases of the nervous system and sense organs 05/16/2018    History of eustachian tube dysfunction    Personal history of other diseases of the nervous system and sense organs 06/21/2015    History of acute otitis media    Personal history of other diseases of the nervous system and sense organs 05/23/2018    History of eustachian tube dysfunction    Personal history of other diseases of the respiratory system 04/13/2019    History of tonsillitis    Personal history of other diseases of the respiratory system 08/14/2016    History of sore throat    Personal history of other diseases of the respiratory system 12/12/2017    History of acute bronchitis    Personal history of other diseases of the respiratory system 04/13/2019    History of upper respiratory infection    Personal history of other diseases of the respiratory system 11/24/2014    History of sinusitis    Personal history of other diseases of the respiratory system 08/08/2018    History of chronic sinusitis    Personal history of other specified conditions 11/30/2021    History of nasal congestion    Personal history of other specified conditions 08/23/2013    History of nausea    Personal history of other specified conditions 09/26/2022    History of insomnia    Personal history of other specified conditions 04/21/2021    History of facial pain    Unspecified hearing loss, left ear 12/26/2019    Hearing loss, left       Medication review  Medication Documentation Review Audit       Reviewed by Cole C Budinsky, MD (Physician) on 01/29/24 at 1515      Medication Order Taking? Sig Documenting Provider Last Dose Status   FLUoxetine (PROzac) 10 mg capsule [id number] No Take  1 capsule (10 mg) by mouth once daily. Gerardo Nevarez MD 2024 0800 Active   ibuprofen 800 mg tablet [id number] No Take 1 tablet (800 mg) by mouth 3 times a day as needed. Take with food. Historical Provider, MD Past Month Active   oxyCODONE-acetaminophen (Percocet) 5-325 mg tablet [id number] No Take 1 tablet by mouth every 4 hours if needed for severe pain (7 - 10) for up to 7 days. Jules Gonzalez MD Not Taking  24 2355                    Allergies  No Known Allergies    Social History  Social History     Socioeconomic History    Marital status:      Spouse name: Not on file    Number of children: Not on file    Years of education: Not on file    Highest education level: Not on file   Occupational History    Not on file   Tobacco Use    Smoking status: Never     Passive exposure: Never    Smokeless tobacco: Never   Substance and Sexual Activity    Alcohol use: Not Currently     Alcohol/week: 2.0 standard drinks of alcohol     Types: 2 Standard drinks or equivalent per week    Drug use: Never    Sexual activity: Defer   Other Topics Concern    Not on file   Social History Narrative    Not on file     Social Determinants of Health     Financial Resource Strain: Not on file   Food Insecurity: Not on file   Transportation Needs: Not on file   Physical Activity: Not on file   Stress: Not on file   Social Connections: Not on file   Intimate Partner Violence: Not on file   Housing Stability: Not on file       Surgical History  Past Surgical History:   Procedure Laterality Date    LASIK      WISDOM TOOTH EXTRACTION         Physical Exam:  GENERAL:  Patient is awake, alert, and oriented to person place and time.  Patient appears well nourished and well kept.  Affect Calm, Not Acutely Distressed.  HEENT:  Normocephalic, Atraumatic, EOMI  CARDIOVASCULAR:  Hemodynamically stable.  RESPIRATORY:  Normal respirations with unlabored breathing.  NEURO: Gross sensation intact to the lower extremities  bilaterally.  Extremity: Left hip exam demonstrates minimal tenderness to palpation over the greater trochanter bursa with more posterior insertional gluteal pain.  There is no redness warmth or erythema in the targeted area of injection.  He is ambulating with a slight limp.      Diagnostics: No new imaging today, previous MRI results reviewed consistent with SLAP tear of the labrum as well as left greater trochanter bursitis and gluteus minimus insertional tendinosis.        Procedure: Ultrasound Guided left greater Trochanteric Bursitis Injection:    Before aspiration/injection, the risks  of this procedure including but not limited to;  infection, local skin irritation, skin atrophy, calcification, continued pain or discomfort, elevated blood sugar, burning, failure to relieve pain, possible late infection were all discussed with the patient.  The patient verbalized understanding and consented to the procedure.     After informed consent was provided, patient identification was confirmed, and allergies were verified, the patient was appropriately positioned. The patient's [ Left ] Greater Trochanteric Bursae was evaluated via ultrasound in long axis to identify the GTB space.    The site was marked and time-out performed.  The injection site was prepped in the usual sterile manner to provide a sterile environment. The skin was anesthetized with ethyl chloride spray. The aspiration/injection was performed with standard technique. A 22G Spinal needle was passed through the skin into the GTB space under direct ultrasound guidance with sterile precautions in a lateral to medial approach. Next, [9] cc´s of injectate consisting of [1] cc´s of [ Celestone ] and [8] cc´s of 1% lidocaine without epinephrine was instilled into the bursal space.    The needle was withdrawn and the puncture site was secured with a Band-Aid. The patient tolerated the procedure well without complication.     Post-procedure discomfort can be  alleviated with additional medication, ice, elevation, and rest over the first 24 hours as recommended.    L Inj/Asp: L greater trochanteric bursa on 1/29/2024 3:14 PM  Indications: pain  Details: 22 G needle, ultrasound-guided lateral approach  Medications: 6 mg betamethasone acet,sod phos 6 mg/mL; 8 mL lidocaine 10 mg/mL (1 %)  Outcome: tolerated well, no immediate complications  Procedure, treatment alternatives, risks and benefits explained, specific risks discussed. Consent was given by the patient. Immediately prior to procedure a time out was called to verify the correct patient, procedure, equipment, support staff and site/side marked as required. Patient was prepped and draped in the usual sterile fashion.           Assessment:   Problem List Items Addressed This Visit       Left hip pain    Relevant Orders    Point of Care Ultrasound (Completed)    L Inj/Asp: L greater trochanteric bursa        Plan: Patient received and tolerated the injection well and there is a significant amount of posterior sided bursa at the insertion of the gluteus minimus with partial thickness tearing seen.  This area was primary target and injected successfully.  Patient will follow-up with Dr. Jules Gonzalez going forward in 4 weeks as previously requested.  He should ice the lateral aspect of the left hip, he may continue with activities as tolerated going forward after the next 24 to 48 hours.  He may continue with his left knee rehab status post meniscus surgery.  Orders Placed This Encounter    L Inj/Asp: L greater trochanteric bursa    Point of Care Ultrasound      At the conclusion of the visit there were no further questions by the patient/family regarding their plan of care.  Patient was instructed to call or return with any issues, questions, or concerns regarding their injury and/or treatment plan described above.     01/29/24 at 3:16 PM - Cole C Budinsky, MD    Office: [phone]    This note was prepared using voice  recognition software.  The details of this note are correct and have been reviewed, and corrected to the best of my ability.  Some grammatical errors may persist related to the Dragon software.

## 2024-02-08 ENCOUNTER — APPOINTMENT (OUTPATIENT)
Dept: ORTHOPEDIC SURGERY | Facility: CLINIC | Age: 44
End: 2024-02-08
Payer: COMMERCIAL

## 2024-02-12 ENCOUNTER — APPOINTMENT (OUTPATIENT)
Dept: ORTHOPEDIC SURGERY | Facility: CLINIC | Age: 44
End: 2024-02-12
Payer: COMMERCIAL

## 2024-02-22 ENCOUNTER — PROCEDURE VISIT (OUTPATIENT)
Dept: ORTHOPEDIC SURGERY | Facility: CLINIC | Age: 44
End: 2024-02-22
Payer: COMMERCIAL

## 2024-02-22 DIAGNOSIS — S83.232D COMPLEX TEAR OF MEDIAL MENISCUS OF LEFT KNEE AS CURRENT INJURY, SUBSEQUENT ENCOUNTER: Primary | ICD-10-CM

## 2024-02-22 PROCEDURE — 99024 POSTOP FOLLOW-UP VISIT: CPT | Performed by: ORTHOPAEDIC SURGERY

## 2024-02-22 NOTE — PROGRESS NOTES
History of Present Illness  Still a little sore along the medial joint line but knee is doing better  Exam  Incisions are healed.  Flexes to 130.  Distal neurovascular exam intact     Operative Findings  Partial medial meniscectomy 15%     Assessment  Patient status post left knee arthroscopy partial medial meniscectomy      Plan  Reviewed arthroscopic photos and findings.  Discussed short and long term implications for the knee.  Discussed analgesics, ice, rest.  Encouraged home exercise program, physical therapy.   This hip bursa injection has worked well for him and encouraged him to get foam roller and continue therapy.  I will see me back as needed

## 2024-03-01 DIAGNOSIS — F41.9 ANXIETY: ICD-10-CM

## 2024-03-01 RX ORDER — FLUOXETINE 10 MG/1
10 CAPSULE ORAL DAILY
Qty: 90 CAPSULE | Refills: 1 | Status: SHIPPED | OUTPATIENT
Start: 2024-03-01

## 2024-04-11 ENCOUNTER — OFFICE VISIT (OUTPATIENT)
Dept: PRIMARY CARE | Facility: CLINIC | Age: 44
End: 2024-04-11
Payer: COMMERCIAL

## 2024-04-11 VITALS
TEMPERATURE: 97.3 F | RESPIRATION RATE: 16 BRPM | BODY MASS INDEX: 28.39 KG/M2 | WEIGHT: 202.8 LBS | HEIGHT: 71 IN | SYSTOLIC BLOOD PRESSURE: 114 MMHG | OXYGEN SATURATION: 98 % | HEART RATE: 82 BPM | DIASTOLIC BLOOD PRESSURE: 86 MMHG

## 2024-04-11 DIAGNOSIS — F41.9 ANXIETY: ICD-10-CM

## 2024-04-11 DIAGNOSIS — R03.0 BLOOD PRESSURE ELEVATED WITHOUT HISTORY OF HTN: Primary | ICD-10-CM

## 2024-04-11 DIAGNOSIS — E55.9 VITAMIN D DEFICIENCY: ICD-10-CM

## 2024-04-11 DIAGNOSIS — E78.5 DYSLIPIDEMIA, GOAL LDL BELOW 100: ICD-10-CM

## 2024-04-11 DIAGNOSIS — J45.20 MILD INTERMITTENT REACTIVE AIRWAY DISEASE WITHOUT COMPLICATION (HHS-HCC): ICD-10-CM

## 2024-04-11 PROBLEM — J01.00 SUBACUTE MAXILLARY SINUSITIS: Status: RESOLVED | Noted: 2023-08-10 | Resolved: 2024-04-11

## 2024-04-11 PROBLEM — R06.09 DYSPNEA ON EXERTION: Status: RESOLVED | Noted: 2023-08-10 | Resolved: 2024-04-11

## 2024-04-11 PROBLEM — J32.2 CHRONIC ETHMOIDAL SINUSITIS: Status: RESOLVED | Noted: 2023-08-10 | Resolved: 2024-04-11

## 2024-04-11 PROCEDURE — 99214 OFFICE O/P EST MOD 30 MIN: CPT | Performed by: INTERNAL MEDICINE

## 2024-04-11 PROCEDURE — 1036F TOBACCO NON-USER: CPT | Performed by: INTERNAL MEDICINE

## 2024-04-11 PROCEDURE — 3008F BODY MASS INDEX DOCD: CPT | Performed by: INTERNAL MEDICINE

## 2024-04-11 ASSESSMENT — ENCOUNTER SYMPTOMS
LOSS OF SENSATION IN FEET: 0
DEPRESSION: 0
OCCASIONAL FEELINGS OF UNSTEADINESS: 0

## 2024-04-11 ASSESSMENT — PATIENT HEALTH QUESTIONNAIRE - PHQ9
SUM OF ALL RESPONSES TO PHQ9 QUESTIONS 1 AND 2: 0
1. LITTLE INTEREST OR PLEASURE IN DOING THINGS: NOT AT ALL
2. FEELING DOWN, DEPRESSED OR HOPELESS: NOT AT ALL

## 2024-04-11 NOTE — PROGRESS NOTES
"Subjective   Patient ID: Johnny Henry is a 44 y.o. male who presents for Follow-up.    Here for follow-up  Doing better following his knee surgery in January  He is now swimming 3 days weekly, 30 to 40 minutes  Anxiety has been doing well    Recent upper respiratory illness-resolving  COVID illness in December-resolved completely         Review of Systems    Objective   /90 (BP Location: Left arm, Patient Position: Sitting, BP Cuff Size: Adult)   Pulse 82   Temp 36.3 °C (97.3 °F)   Resp 16   Ht 1.803 m (5' 11\")   Wt 92 kg (202 lb 12.8 oz)   SpO2 98%   BMI 28.28 kg/m²     Physical Exam  Vitals reviewed.   Constitutional:       Appearance: Normal appearance.   HENT:      Head: Normocephalic and atraumatic.   Eyes:      General: No scleral icterus.        Right eye: No discharge.         Left eye: No discharge.      Extraocular Movements: Extraocular movements intact.      Conjunctiva/sclera: Conjunctivae normal.      Pupils: Pupils are equal, round, and reactive to light.   Cardiovascular:      Rate and Rhythm: Normal rate and regular rhythm.      Pulses: Normal pulses.      Heart sounds: Normal heart sounds. No murmur heard.  Pulmonary:      Effort: Pulmonary effort is normal.      Breath sounds: Normal breath sounds. No wheezing or rhonchi.   Musculoskeletal:         General: No deformity or signs of injury. Normal range of motion.      Cervical back: Normal range of motion and neck supple. No rigidity or tenderness.   Lymphadenopathy:      Cervical: No cervical adenopathy.   Skin:     General: Skin is warm and dry.      Findings: No rash.   Neurological:      General: No focal deficit present.      Mental Status: He is alert and oriented to person, place, and time. Mental status is at baseline.      Cranial Nerves: No cranial nerve deficit.      Sensory: No sensory deficit.      Gait: Gait normal.   Psychiatric:         Mood and Affect: Mood normal.         Behavior: Behavior normal.         " Thought Content: Thought content normal.         Judgment: Judgment normal.         Assessment/Plan   Problem List Items Addressed This Visit             ICD-10-CM    Anxiety F41.9    Blood pressure elevated without history of HTN - Primary R03.0    Relevant Orders    Lipid Panel    TSH with reflex to Free T4 if abnormal    Vitamin D 25-Hydroxy,Total (for eval of Vitamin D levels)    Basic Metabolic Panel    Dyslipidemia, goal LDL below 100 E78.5    Relevant Orders    Lipid Panel    TSH with reflex to Free T4 if abnormal    Vitamin D 25-Hydroxy,Total (for eval of Vitamin D levels)    Basic Metabolic Panel    Reactive airway disease J45.909     Other Visit Diagnoses         Codes    Vitamin D deficiency     E55.9    Relevant Orders    Vitamin D 25-Hydroxy,Total (for eval of Vitamin D levels)            COVID illness-December 2023-though this was difficult he has resolved without sequela I    Recent upper respiratory illness-sinus congestion last week-will use Flonase twice daily for the next week or so for lingering postnasal drip symptoms         Panic attacks with situational depression/anxiety-originally occurred around 2000 after he got out of the [employer], when his parents were getting a divorce and he had not yet been able to restart school. [facility] helpful. He notes that as an adolescent he had ADHD issues-in social anxiety issues as well in the past.   His symptoms recurred early June 2021 while they were putting off her down on the house that he and his wife decided to buy after some 14 years in their small starter home. His anxiousness and apprehension and overall uneasiness is quite similar to what happened before he notes. After establishing with psychology-Dr. Sam Pruett and starting fluoxetine and working with psychiatry his symptoms improved dramatically.  He has not needed anxiety medicines in some time.              10/23-on 20 mg of fluoxetine consistently he is done quite  well this year.  He did have some sleep issues when he had 's over for 6 weeks.  Improved presently.  I suggested trying to reduce the fluoxetine from 20 down to 10 mg daily.  He will call with concerns               4/24-doing well on low-dose fluoxetine.  Swimming 3 days weekly helps relieve stress as well.  He will continue this         Sleeplessness - Longstanding issue for yrs. mainly wakes at 3am. he understands fluoxetine will help this. Advised to stop medicating with alprazolam for sleep aid. Encouraged to continue practicing sleep hygiene and melatonin trial - start w/ 10 mg, advance to 25-30mg and use consistently   He has found that [facility] melatonin 10 mg sufficient and will continue as needed               4/24-sleeping better     Elevated blood pressure-likely related to his stress-we will reassess on follow-up              Presently improved.  Will continue to follow      dyslipidemia/elevated weight-we'll check annual lipids. With his wife as a dietitian, his diet remains quite healthy.   June '22 Lipids relatively favorable. Though LDL is 127. BMI 28. He'll resume exercising             10/23-lipids unfortunately worsened-LDL 90 range.  BMI 28.  Exercise and weight loss               4/24-BMI 28.3.  Will continue to follow and check lipids before follow-up     Exercise routine-he has enjoyed walking daily and playing golf.  In 2023,  Considering his knee-suggested swimming, elliptical or recumbent cycle 3 to 4 days weekly              4/24-he is swimming 30 to 40 minutes, 3 days weekly at his rec center in [place].  It has been a good workout for him    Hx Left shoulder pain-improved presently after orthopedic input    Left hip mild arthritis and labrum tear-      January 20 24 bursa injection with Dr. Budinski was helpful.  MRI showed mild arthritis and labrum tear.  He will continue stretching.  Improved with swimming    Left knee meniscal injury- from January 2023 ski accident.               Left knee scope Jan '24 w/ Dr. Gonzalez. Back to full ROM     Right hip and knee pain- saw specialist in 2018. He has a small right hip spur.  Mild right hip arthritis on early 2024 x-ray.  Doing well presently with swimming routine    Functional sinusitis / Seasonal congestion/eustachian tube presently off Zyrtec and fluticasone following spring '18 allergy and ENT eval per Dr. Espino and Dr. Pinzon. humidified air in winter and if needed, restart Singulair. He will follow up either here or there with concerns.     Reactive airway disease - now running 3 x wk this summer, following unremarkable PFTs per Dr. Espino summer '18. When necessary Singulair in the winter if needed.       Right scapular spasm-heating pad and stretching encouraged follow-up with concerns for this mild trapezius muscle strain most noticeable work. Encouraged optimizing position at work particularly sitting with his work station. Unfortunately he does not have the standing desk option.        Dental plan/bruxism- he will continue dental visits semiannually. mouthguard nightly has been used, but he forgets often he notes.     Back nevi - noticed by spouse. These may represent seborrheic keratoses. He will see dermatology accordingly. Saw Dermatologist in spring '19 - Dr. Jean Baptiste, who froze several lesions.     Contact lenses / vision care - Dr. Melissa Davey annually     Summer safety concerns - sun screen, tick precautions, insect repellant, yard work safety encouraged last year at his physical        Flu shot encouraged after Labor Day- updated 10/24/23 - today     Tetanus - updated June '22     Follow-up 6 months, sooner as needed.      Charting was completed using voice recognition technology and may include unintended errors.

## 2024-04-22 ENCOUNTER — HOSPITAL ENCOUNTER (OUTPATIENT)
Dept: RADIOLOGY | Facility: CLINIC | Age: 44
Discharge: HOME | End: 2024-04-22
Payer: COMMERCIAL

## 2024-04-22 DIAGNOSIS — M79.672 LEFT FOOT PAIN: ICD-10-CM

## 2024-04-22 PROCEDURE — 73630 X-RAY EXAM OF FOOT: CPT | Mod: LT

## 2024-04-22 PROCEDURE — 73630 X-RAY EXAM OF FOOT: CPT | Mod: LEFT SIDE | Performed by: RADIOLOGY

## 2024-05-06 ENCOUNTER — TELEPHONE (OUTPATIENT)
Dept: PRIMARY CARE | Facility: CLINIC | Age: 44
End: 2024-05-06
Payer: COMMERCIAL

## 2024-05-06 DIAGNOSIS — M79.673 PAIN OF FOOT, UNSPECIFIED LATERALITY: Primary | ICD-10-CM

## 2024-05-06 NOTE — TELEPHONE ENCOUNTER
Pt recently was cutting tree down - and it fell on foot - went to [facility],had xray done they said nothing broken but pt is still in pain - wants to know if he should see Dr Nevarez (if so where can he be added, prefers to see only Dr Nevarez) or can add referral for foot orthopedic. Please call and advise

## 2024-06-06 ENCOUNTER — OFFICE VISIT (OUTPATIENT)
Dept: ORTHOPEDIC SURGERY | Facility: CLINIC | Age: 44
End: 2024-06-06
Payer: COMMERCIAL

## 2024-06-06 VITALS — BODY MASS INDEX: 28.7 KG/M2 | HEIGHT: 71 IN | WEIGHT: 205 LBS

## 2024-06-06 DIAGNOSIS — M79.672 LEFT FOOT PAIN: ICD-10-CM

## 2024-06-06 DIAGNOSIS — S90.32XS CONTUSION OF LEFT FOOT, SEQUELA: Primary | ICD-10-CM

## 2024-06-06 PROCEDURE — 1036F TOBACCO NON-USER: CPT

## 2024-06-06 PROCEDURE — 3008F BODY MASS INDEX DOCD: CPT

## 2024-06-06 PROCEDURE — 99213 OFFICE O/P EST LOW 20 MIN: CPT

## 2024-06-06 NOTE — PROGRESS NOTES
Subjective    Patient ID: Johnny Henry is a 44 y.o. male.    Chief Complaint: Pain of the Left Foot (Tree fell  across left foot April 2024/Occasional aching. Has noticed improvement)    HPI  This is a pleasant 44-year-old male presenting to the office for evaluation of left dorsal midfoot discomfort/aching, which has been improving, but was sustained from an injury  on April 22, 2024.  Patient states that he was cutting down a smaller size street, when it landed directly onto his left foot.  He was evaluated at that time, when he experienced increased pain bruising and swelling.  Multiple view x-rays of the left foot were obtained without evidence of acute fracture or dislocation.  Patient presents to the office today stating that he is 98% better, but will occasionally feel pain to base of first metatarsal with prolonged standing or activity.  He does state that the superficial skin to this area is somewhat numb to touch, which has been consistent since the injury.  He has not noticed any increased swelling redness or warmth.  He is able to ambulate without difficulty.  He works at [employer], but also does a lot of work around the house.    The patient's past medical, surgical, family, and social history as well as allergies and medications were reviewed and updated in the chart.    Objective   Ortho Exam  Pleasant in no acute distress.  Walks in the office today with a normal gait.  Left foot and ankle appearing without soft tissue swelling erythema or ecchymosis.  There is an area noted to dorsal foot near the base of first metatarsal, which appears as a chronic bruise.  This area is not tender upon palpation.  He does have somewhat decreased sensation to this area.  He has full range of motion of left foot and ankle in terms of inversion eversion dorsiflexion and plantarflexion.  Adequate strength with resisted dorsiflexion plantarflexion.    Image Results:  XR foot left 3+ views  Narrative: Interpreted By:   Adri Mcmahon,   STUDY:  XR FOOT LEFT 3+ VIEWS; ;  4/22/2024 9:22 am      INDICATION:  Signs/Symptoms:pain s.p injury.      COMPARISON:  None.      ACCESSION NUMBER(S):  [id number]      ORDERING CLINICIAN:  SWEETIE RAMSEY      FINDINGS:  Three views left foot:  There is no fracture or dislocation.      Impression: Negative left foot.          MACRO:  None      Signed by: Adri Mcmahon 4/22/2024 9:27 AM  Dictation workstation:   [id number]    Multiple view x-rays of the left foot obtained in April 2024 personally reviewed, without evidence of acute fracture or dislocation.    Assessment/Plan   Encounter Diagnoses:  Pain of foot, unspecified laterality    Contusion of left foot which has been healing, sustained from an injury on April 22, 2024    Plan: Discussion with patient about injury with review of previous x-rays.  Explained to patient that he is approximately 6 weeks out from his injury, and it is not uncommon to occasionally feel pain at injury site.  This pain can recur specifically with prolonged standing or walking or activity, which is when patient experiences at the most.  He also notices increased discomfort at night.  Explained to patient that these type of symptoms can occur intermittently over the next 3 to 4 weeks.  Explained that the numbness to the top of his foot will also take months to fully recover.  If patient does not see significant relief of symptoms in 4 to 6 weeks, an MRI of the left foot would be indicated to assess for stress fracture internal derangement.  He can follow-up as symptoms dictate.

## 2024-06-24 DIAGNOSIS — F41.9 ANXIETY: ICD-10-CM

## 2024-06-24 RX ORDER — FLUOXETINE 10 MG/1
10 CAPSULE ORAL DAILY
Qty: 90 CAPSULE | Refills: 0 | Status: SHIPPED | OUTPATIENT
Start: 2024-06-24

## 2024-09-17 ENCOUNTER — APPOINTMENT (OUTPATIENT)
Dept: PRIMARY CARE | Facility: CLINIC | Age: 44
End: 2024-09-17
Payer: COMMERCIAL

## 2024-09-17 DIAGNOSIS — F41.9 ANXIETY: ICD-10-CM

## 2024-09-17 DIAGNOSIS — E66.3 OVERWEIGHT WITH BODY MASS INDEX (BMI) OF 28 TO 28.9 IN ADULT: ICD-10-CM

## 2024-09-17 DIAGNOSIS — E78.5 DYSLIPIDEMIA, GOAL LDL BELOW 100: ICD-10-CM

## 2024-09-17 DIAGNOSIS — R03.0 BLOOD PRESSURE ELEVATED WITHOUT HISTORY OF HTN: ICD-10-CM

## 2024-09-17 DIAGNOSIS — E55.9 VITAMIN D DEFICIENCY: ICD-10-CM

## 2024-09-17 DIAGNOSIS — Z97.3 WEARS CONTACT LENSES: ICD-10-CM

## 2024-09-17 DIAGNOSIS — R03.0 WHITE COAT SYNDROME WITHOUT DIAGNOSIS OF HYPERTENSION: ICD-10-CM

## 2024-09-17 DIAGNOSIS — Z00.00 ANNUAL PHYSICAL EXAM: Primary | ICD-10-CM

## 2024-09-17 DIAGNOSIS — Z12.11 COLON CANCER SCREENING: ICD-10-CM

## 2024-09-17 PROCEDURE — 3080F DIAST BP >= 90 MM HG: CPT | Performed by: INTERNAL MEDICINE

## 2024-09-17 PROCEDURE — 3008F BODY MASS INDEX DOCD: CPT | Performed by: INTERNAL MEDICINE

## 2024-09-17 PROCEDURE — 99396 PREV VISIT EST AGE 40-64: CPT | Performed by: INTERNAL MEDICINE

## 2024-09-17 PROCEDURE — 3077F SYST BP >= 140 MM HG: CPT | Performed by: INTERNAL MEDICINE

## 2024-09-17 RX ORDER — FLUOXETINE 10 MG/1
10 CAPSULE ORAL DAILY
Qty: 90 CAPSULE | Refills: 3 | OUTPATIENT
Start: 2024-09-17

## 2024-09-17 RX ORDER — FLUOXETINE 10 MG/1
10 CAPSULE ORAL DAILY
Qty: 90 CAPSULE | Refills: 1 | Status: SHIPPED | OUTPATIENT
Start: 2024-09-17

## 2024-09-17 ASSESSMENT — PATIENT HEALTH QUESTIONNAIRE - PHQ9
1. LITTLE INTEREST OR PLEASURE IN DOING THINGS: NOT AT ALL
2. FEELING DOWN, DEPRESSED OR HOPELESS: NOT AT ALL
SUM OF ALL RESPONSES TO PHQ9 QUESTIONS 1 AND 2: 0

## 2024-09-17 NOTE — PROGRESS NOTES
"Subjective   Patient ID: Johnny Henry is a 44 y.o. male who presents for Annual Exam (Patient is here for an annual physical exam. ).    Here for semiannual visit and wellness visit  Doing well without illnesses or injuries.  Remains active.         Review of Systems    Objective   BP (!) 149/98 (BP Location: Left arm, Patient Position: Sitting, BP Cuff Size: Adult)   Pulse 67   Temp 37.1 °C (98.7 °F) (Temporal)   Ht 1.803 m (5' 11\")   Wt 92.4 kg (203 lb 9.6 oz)   SpO2 97%   BMI 28.40 kg/m²     Physical Exam  Constitutional:       Appearance: Normal appearance.   HENT:      Head: Normocephalic and atraumatic.      Right Ear: Tympanic membrane normal.      Left Ear: Tympanic membrane normal.      Nose: Nose normal.   Eyes:      General: No scleral icterus.     Extraocular Movements: Extraocular movements intact.      Conjunctiva/sclera: Conjunctivae normal.      Pupils: Pupils are equal, round, and reactive to light.   Cardiovascular:      Rate and Rhythm: Normal rate and regular rhythm.      Pulses: Normal pulses.      Heart sounds: Normal heart sounds. No murmur heard.  Pulmonary:      Effort: Pulmonary effort is normal. No respiratory distress.      Breath sounds: Normal breath sounds. No stridor. No wheezing.   Abdominal:      General: Abdomen is flat. Bowel sounds are normal. There is no distension.      Palpations: Abdomen is soft. There is no mass.      Tenderness: There is no abdominal tenderness. There is no guarding.   Musculoskeletal:         General: No swelling, tenderness or deformity. Normal range of motion.      Cervical back: Normal range of motion and neck supple. No tenderness.   Lymphadenopathy:      Cervical: No cervical adenopathy.   Skin:     General: Skin is warm and dry.      Findings: No lesion or rash.   Neurological:      General: No focal deficit present.      Mental Status: He is alert and oriented to person, place, and time.      Cranial Nerves: No cranial nerve deficit.      " Motor: No weakness.   Psychiatric:         Mood and Affect: Mood normal.         Behavior: Behavior normal.         Thought Content: Thought content normal.         Judgment: Judgment normal.         Assessment/Plan   Problem List Items Addressed This Visit             ICD-10-CM    Anxiety F41.9    Relevant Medications    FLUoxetine (PROzac) 10 mg capsule    White coat syndrome without diagnosis of hypertension R03.0    Dyslipidemia, goal LDL below 100 E78.5    Overweight with body mass index (BMI) of 28 to 28.9 in adult E66.3, Z68.28    Wears contact lenses Z97.3    Vitamin D deficiency E55.9    Annual physical exam - Primary Z00.00     Other Visit Diagnoses         Codes    Colon cancer screening     Z12.11    Relevant Orders    Colonoscopy Screening; Average Risk Patient             Portions of this encounter note have been copied from my previous note dated 4/11/23  , which have been updated where appropriate and all reflect my current medical decision making from today.           White coat syndrome with elevated blood pressure-likely related to his stress-we will reassess on follow-up              9/24-blood pressure elevated in the office.  Blood pressure typically better at home.  He will check his blood pressure 2-3 times weekly and call if the blood pressure average consistently over 130/90.  He will follow-up in 8 weeks to review this in further detail.        dyslipidemia/elevated weight-we'll check annual lipids. With his wife as a dietitian, his diet remains quite healthy.   June '22 Lipids relatively favorable. Though LDL is 127. BMI 28. He'll resume exercising             10/23-lipids unfortunately worsened-LDL 90 range.  BMI 28.  Exercise and weight loss               4/24-BMI 28.3.  Will continue to follow and check lipids before follow-up     Exercise routine-he has enjoyed walking daily and playing golf.  In 2023,  Considering his knee-suggested swimming, elliptical or recumbent cycle 3 to 4 days  weekly              4/24-he is swimming 30 to 40 minutes, 3 days weekly at his rec center in [place].  It has been a good workout for him    Hx Left shoulder pain-improved presently after orthopedic input    Left foot pain-evaluated in April 2024-in urgent care after he was injured when a tree fell on his foot.  Improved presently after Ortho evaluation    Left hip mild arthritis and labrum tear-      January 20 24 bursa injection with Dr. Budinski was helpful.  MRI showed mild arthritis and labrum tear.  He will continue stretching.  Improved with swimming    S/p left knee arthroscopy for left knee meniscal injury- from January 2023 ski accident.              Left knee scope Jan '24 w/ Dr. Gonzalez. Back to full ROM     Right hip and knee pain- saw specialist in 2018. He has a small right hip spur.  Mild right hip arthritis on early 2024 x-ray.  Doing well presently with swimming routine    Functional sinusitis / Seasonal congestion/eustachian tube presently off Zyrtec and fluticasone following spring '18 allergy and ENT eval per Dr. Espino and Dr. Pinzon. humidified air in winter and if needed, restart Singulair. He will follow up either here or there with concerns.     Reactive airway disease - now running 3 x wk this summer, following unremarkable PFTs per Dr. Espnio summer '18. When necessary Singulair in the winter if needed.       Right scapular spasm-heating pad and stretching encouraged follow-up with concerns for this mild trapezius muscle strain most noticeable work. Encouraged optimizing position at work particularly sitting with his work station. Unfortunately he does not have the standing desk option.    Colon cancer screening-colonoscopy ordered 9/24        Dental plan/bruxism- he will continue dental visits semiannually. mouthguard nightly has been used, but he forgets often he notes.     Back nevi - noticed by spouse. These may represent seborrheic keratoses. He will see dermatology  accordingly. Saw Dermatologist in spring '19 - Dr. Jean Baptiste, who froze several lesions.     Contact lenses / vision care - Dr. Melissa Davey annually      Panic attacks with situational depression/anxiety-originally occurred around 2000 after he got out of the [employer], when his parents were getting a divorce and he had not yet been able to restart school. [facility]. He notes that as an adolescent he had ADHD issues-in social anxiety issues as well in the past.   His symptoms recurred early June 2021 while they were putting off her down on the house that he and his wife decided to buy after some 14 years in their small starter home. His anxiousness and apprehension and overall uneasiness is quite similar to what happened before he notes. After establishing with psychology-Dr. Sam Pruett and starting fluoxetine and working with psychiatry his symptoms improved dramatically.  He has not needed anxiety medicines in some time.              10/23-on 20 mg of fluoxetine consistently he is done quite well this year.  He did have some sleep issues when he had 's over for 6 weeks.  Improved presently.  I suggested trying to reduce the fluoxetine from 20 down to 10 mg daily.  He will call with concerns               4/24-doing well on low-dose fluoxetine.  Swimming 3 days weekly helps relieve stress as well.  He will continue this             9/24-doing well with low-dose fluoxetine.  He will continue         Sleeplessness - Longstanding issue for yrs. mainly wakes at 3am. he understands fluoxetine will help this. Advised to stop medicating with alprazolam for sleep aid. Encouraged to continue practicing sleep hygiene and melatonin trial - start w/ 10 mg, advance to 25-30mg and use consistently   He has found that [facility] melatonin 10 mg sufficient and will continue as needed               4/24-sleeping better     Summer safety concerns - sun screen, tick precautions, insect  repellant, yard work safety encouraged last year at his physical        Flu shot encouraged after Labor Day- updated 10/24/23 - today     Tetanus - updated June '22     Follow-up 6 months, sooner as needed.      Charting was completed using voice recognition technology and may include unintended errors.

## 2024-09-20 VITALS
HEIGHT: 71 IN | WEIGHT: 203.6 LBS | OXYGEN SATURATION: 97 % | SYSTOLIC BLOOD PRESSURE: 142 MMHG | DIASTOLIC BLOOD PRESSURE: 96 MMHG | BODY MASS INDEX: 28.5 KG/M2 | TEMPERATURE: 98.7 F | HEART RATE: 67 BPM

## 2024-09-24 ENCOUNTER — OFFICE VISIT (OUTPATIENT)
Dept: URGENT CARE | Age: 44
End: 2024-09-24
Payer: COMMERCIAL

## 2024-09-24 ENCOUNTER — TELEPHONE (OUTPATIENT)
Dept: PRIMARY CARE | Facility: CLINIC | Age: 44
End: 2024-09-24

## 2024-09-24 ENCOUNTER — HOSPITAL ENCOUNTER (OUTPATIENT)
Dept: RADIOLOGY | Facility: CLINIC | Age: 44
Discharge: HOME | End: 2024-09-24
Payer: COMMERCIAL

## 2024-09-24 ENCOUNTER — LAB (OUTPATIENT)
Dept: LAB | Facility: LAB | Age: 44
End: 2024-09-24
Payer: COMMERCIAL

## 2024-09-24 VITALS
OXYGEN SATURATION: 99 % | BODY MASS INDEX: 28.59 KG/M2 | HEART RATE: 62 BPM | DIASTOLIC BLOOD PRESSURE: 101 MMHG | TEMPERATURE: 97.5 F | SYSTOLIC BLOOD PRESSURE: 152 MMHG | WEIGHT: 205 LBS | RESPIRATION RATE: 16 BRPM

## 2024-09-24 DIAGNOSIS — R07.82 INTERCOSTAL PAIN: Primary | ICD-10-CM

## 2024-09-24 DIAGNOSIS — R03.0 BLOOD PRESSURE ELEVATED WITHOUT HISTORY OF HTN: ICD-10-CM

## 2024-09-24 DIAGNOSIS — E78.5 DYSLIPIDEMIA, GOAL LDL BELOW 100: ICD-10-CM

## 2024-09-24 DIAGNOSIS — E55.9 VITAMIN D DEFICIENCY: ICD-10-CM
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
## 2024-09-24 DIAGNOSIS — R07.82 INTERCOSTAL PAIN: ICD-10-CM

## 2024-09-24 DIAGNOSIS — S22.31XA CLOSED FRACTURE OF ONE RIB OF RIGHT SIDE, INITIAL ENCOUNTER: ICD-10-CM

## 2024-09-24 DIAGNOSIS — S22.39XA CLOSED FRACTURE OF ONE RIB, UNSPECIFIED LATERALITY, INITIAL ENCOUNTER: Primary | ICD-10-CM

## 2024-09-24 LAB
25(OH)D3 SERPL-MCNC: 37 NG/ML (ref 30–100)
ANION GAP SERPL CALC-SCNC: 12 MMOL/L (ref 10–20)
BUN SERPL-MCNC: 13 MG/DL (ref 6–23)
CALCIUM SERPL-MCNC: 9.3 MG/DL (ref 8.6–10.6)
CHLORIDE SERPL-SCNC: 103 MMOL/L (ref 98–107)
CHOLEST SERPL-MCNC: 266 MG/DL (ref 0–199)
CHOLESTEROL/HDL RATIO: 5.5
CO2 SERPL-SCNC: 30 MMOL/L (ref 21–32)
CREAT SERPL-MCNC: 1.25 MG/DL (ref 0.5–1.3)
EGFRCR SERPLBLD CKD-EPI 2021: 72 ML/MIN/1.73M*2
GLUCOSE SERPL-MCNC: 90 MG/DL (ref 74–99)
HDLC SERPL-MCNC: 48 MG/DL
LDLC SERPL CALC-MCNC: 171 MG/DL
NON HDL CHOLESTEROL: 218 MG/DL (ref 0–149)
POTASSIUM SERPL-SCNC: 4.2 MMOL/L (ref 3.5–5.3)
SODIUM SERPL-SCNC: 141 MMOL/L (ref 136–145)
TRIGL SERPL-MCNC: 233 MG/DL (ref 0–149)
TSH SERPL-ACNC: 1.96 MIU/L (ref 0.44–3.98)
VLDL: 47 MG/DL (ref 0–40)

## 2024-09-24 PROCEDURE — 80048 BASIC METABOLIC PNL TOTAL CA: CPT

## 2024-09-24 PROCEDURE — 82306 VITAMIN D 25 HYDROXY: CPT

## 2024-09-24 PROCEDURE — 80061 LIPID PANEL: CPT

## 2024-09-24 PROCEDURE — 71101 X-RAY EXAM UNILAT RIBS/CHEST: CPT | Mod: RT

## 2024-09-24 PROCEDURE — 71101 X-RAY EXAM UNILAT RIBS/CHEST: CPT | Mod: RIGHT SIDE | Performed by: RADIOLOGY

## 2024-09-24 PROCEDURE — 36415 COLL VENOUS BLD VENIPUNCTURE: CPT

## 2024-09-24 PROCEDURE — 84443 ASSAY THYROID STIM HORMONE: CPT

## 2024-09-24 RX ORDER — IBUPROFEN 800 MG/1
800 TABLET ORAL EVERY 8 HOURS PRN
Qty: 90 TABLET | Refills: 2 | Status: SHIPPED | OUTPATIENT
Start: 2024-09-24 | End: 2024-12-23

## 2024-09-24 RX ORDER — TRAMADOL HYDROCHLORIDE 50 MG/1
50 TABLET ORAL EVERY 8 HOURS PRN
Qty: 9 TABLET | Refills: 0 | Status: SHIPPED | OUTPATIENT
Start: 2024-09-24 | End: 2024-09-27

## 2024-09-24 RX ORDER — CYCLOBENZAPRINE HCL 10 MG
10 TABLET ORAL NIGHTLY PRN
Qty: 30 TABLET | Refills: 2 | Status: SHIPPED | OUTPATIENT
Start: 2024-09-24 | End: 2024-12-23

## 2024-09-24 ASSESSMENT — ENCOUNTER SYMPTOMS: ARTHRALGIAS: 1

## 2024-09-24 ASSESSMENT — PAIN SCALES - GENERAL: PAINLEVEL: 9

## 2024-09-24 NOTE — TELEPHONE ENCOUNTER
Pt of Dr Nevarez's. Calling in regards to having a broken rib from a fall in a hot tub this passed weekend. Went to an UC and was given Tramadol but he states it's not helping his pain too much and the UC advised him to contact his PCP's office. If anything is able to be advised he would like to use [facility] in [place]. Thank you!

## 2024-09-24 NOTE — PROGRESS NOTES
Subjective   Patient ID: Johnny Henry is a 44 y.o. male. They present today with a chief complaint of Injury (Right sided rib injury on 09/22/24. Pt states he slipped while getting out of pool spa. ).    History of Present Illness    Injury    Patient presents to the urgent care for a chief complaint of right-sided posterior rib pain after sustaining fall Saturday as his he was getting into a spot he slipped on the step causing him to fall backwards and hit his right lower back on the steps.  Patient states initially knocked the wind out of him and then ribs became painful.  Patient states felt better yesterday but as he rolled over he heard a crack and pain increased prompting visit to the urgent care has been taking Motrin to no resolve.  No report of respiratory distress no report of hitting head or loss of consciousness    Past Medical History  Allergies as of 09/24/2024    (No Known Allergies)       (Not in a hospital admission)       Past Medical History:   Diagnosis Date    Acute frontal sinusitis, unspecified 03/27/2018    Acute frontal sinusitis    Acute maxillary sinusitis, unspecified 12/11/2019    Subacute maxillary sinusitis    Acute tonsillitis, unspecified 08/14/2016    Acute tonsillitis    Allergic contact dermatitis due to plants, except food 10/03/2019    Poison ivy dermatitis    Chronic ethmoidal sinusitis 11/30/2021    Chronic ethmoidal sinusitis    Chronic rhinitis 02/14/2020    Chronic rhinosinusitis    Depression     Elevated blood-pressure reading, without diagnosis of hypertension 07/30/2014    Elevated blood pressure reading without diagnosis of hypertension    Elevated blood-pressure reading, without diagnosis of hypertension 06/28/2022    Blood pressure elevated without history of HTN    Encounter for examination of ears and hearing without abnormal findings 08/07/2018    Encounter for hearing evaluation    Hesitancy of micturition 03/10/2021    Urinary hesitancy    Lyme disease,  unspecified     Lyme disease    Other abnormal auditory perceptions, unspecified ear 05/23/2018    Abnormal auditory perception    Other conditions influencing health status     Pneumonia    Other conditions influencing health status 09/14/2016    Counseling About Travel    Other specified disorders of ear, bilateral 03/30/2020    Ear fullness, bilateral    Other symptoms and signs involving general sensations and perceptions 04/21/2021    Facial pressure    Pain in left knee 08/10/2017    Left knee pain, unspecified chronicity    Pain in right hip 08/08/2019    Hip pain, right    Personal history of other diseases of the nervous system and sense organs 01/05/2016    History of acute otitis media    Personal history of other diseases of the nervous system and sense organs 05/16/2018    History of eustachian tube dysfunction    Personal history of other diseases of the nervous system and sense organs 06/21/2015    History of acute otitis media    Personal history of other diseases of the nervous system and sense organs 05/23/2018    History of eustachian tube dysfunction    Personal history of other diseases of the respiratory system 04/13/2019    History of tonsillitis    Personal history of other diseases of the respiratory system 08/14/2016    History of sore throat    Personal history of other diseases of the respiratory system 12/12/2017    History of acute bronchitis    Personal history of other diseases of the respiratory system 04/13/2019    History of upper respiratory infection    Personal history of other diseases of the respiratory system 11/24/2014    History of sinusitis    Personal history of other diseases of the respiratory system 08/08/2018    History of chronic sinusitis    Personal history of other specified conditions 11/30/2021    History of nasal congestion    Personal history of other specified conditions 08/23/2013    History of nausea    Personal history of other specified conditions  09/26/2022    History of insomnia    Personal history of other specified conditions 04/21/2021    History of facial pain    Unspecified hearing loss, left ear 12/26/2019    Hearing loss, left       Past Surgical History:   Procedure Laterality Date    KNEE ARTHROSCOPY W/ MENISCECTOMY Left 01/15/2024    LASIK      WISDOM TOOTH EXTRACTION          reports that he has never smoked. He has never been exposed to tobacco smoke. He has never used smokeless tobacco. He reports current alcohol use of about 2.0 standard drinks of alcohol per week. He reports that he does not use drugs.    Review of Systems  Review of Systems   Musculoskeletal:  Positive for arthralgias.   Skin:         Bruising right posterior ribs   All other systems reviewed and are negative.                                 Objective    Vitals:    09/24/24 0903   BP: (!) 152/101   Pulse: 62   Resp: 16   Temp: 36.4 °C (97.5 °F)   SpO2: 99%   Weight: 93 kg (205 lb)     No LMP for male patient.    Physical Exam  Vitals and nursing note reviewed.   Constitutional:       General: He is not in acute distress.     Appearance: Normal appearance. He is not ill-appearing, toxic-appearing or diaphoretic.   Cardiovascular:      Rate and Rhythm: Normal rate and regular rhythm.      Pulses: Normal pulses.      Heart sounds: Normal heart sounds.   Pulmonary:      Effort: Pulmonary effort is normal. No respiratory distress.      Breath sounds: Normal breath sounds. No stridor. No wheezing, rhonchi or rales.      Comments: Thoracic expansion is equal and full,  Musculoskeletal:      Comments: Tenderness upon palpation of right posterior ribs approximately around rib area 8   Skin:     Findings: Bruising present.   Neurological:      General: No focal deficit present.      Mental Status: He is alert and oriented to person, place, and time.   Psychiatric:         Mood and Affect: Mood normal.         Behavior: Behavior normal.         Procedures    Point of Care Test &  Imaging Results from this visit  No results found for this visit on 09/24/24.   No results found.  Due to mechanism of injury, and bruising will obtain right rib series to rule out acute osseous processes such as fracture  Diagnostic study results (if any) were reviewed by Deacon Rodríguez PA-C.  : Radiology impression: Nondisplaced right posterior 10th rib fracture  Assessment/Plan   Allergies, medications, history, and pertinent labs/EKGs/Imaging reviewed by Deacon Rodríguez PA-C.     Medical Decision Making  I did discuss with patient rib fracture, patient will be placed on tramadol due to pain, I did the discussed the importance of several deep breaths every hour to prevent atelectasis.  I did discuss with patient after tramadol may take Tylenol Motrin together.  If any signs of respiratory distress patient is to go to the emergency room or call 911.  Patient verbalized understanding and is agreeable to plan discharge emergent care A+O x 4 stable condition no signs of distress    Orders and Diagnoses  Diagnoses and all orders for this visit:  Intercostal pain  -     XR ribs right 2 views; Future  Fall, initial encounter      Medical Admin Record      Patient disposition: Home    Electronically signed by Deacon Rodrgíuez PA-C  9:10 AM

## 2024-09-24 NOTE — TELEPHONE ENCOUNTER
Called and spoke with the patient who fell this last Saturday night sustaining a rib fracture which he found out earlier today in urgent care.  He slipped on his hot tub.  He was given tramadol but it did not seem to do much and is not lasting the 8 hours as instructed.  We spoke at length    I encouraged him to write out a list of instructions to optimize compliance  Suggested ibuprofen 800 mg every 8 hours with food  Also using Tylenol 500, 2 tablets every 6 hours alternating  Rest encouraged  He will use the tramadol for breakthrough pain for  severe spasms  Cyclobenzaprine for muscle relaxer as needed in the evening understanding this may cause drowsiness  He will call with concerns otherwise follow-up as scheduled

## 2024-09-25 NOTE — RESULT ENCOUNTER NOTE
Caio-thanks for doing the fasting labs.  I am glad that the fasting glucose is normal without signs of diabetes.  The kidney and thyroid labs look fine as does the vitamin D level.    The cholesterol panel shows mixed results.  Though the bad cholesterol/LDL is a bit better than last time, the triglyceride level has worsened.  Please continue lifestyle efforts including a diet low in saturated fat along with regular consistent aerobic fitness, ideally 30 minutes 4-5 times weekly.  This will help improve the cholesterol panel down the road.  We will need to continue to recheck this regularly to follow this closely.    Sincerely,  Gerardo Nevarez MD

## 2024-12-09 ENCOUNTER — APPOINTMENT (OUTPATIENT)
Dept: PRIMARY CARE | Facility: CLINIC | Age: 44
End: 2024-12-09
Payer: COMMERCIAL

## 2025-02-20 ENCOUNTER — APPOINTMENT (OUTPATIENT)
Dept: PRIMARY CARE | Facility: CLINIC | Age: 45
End: 2025-02-20
Payer: COMMERCIAL

## 2025-02-26 DIAGNOSIS — F41.9 ANXIETY: ICD-10-CM

## 2025-02-27 RX ORDER — FLUOXETINE 10 MG/1
10 CAPSULE ORAL DAILY
Qty: 90 CAPSULE | Refills: 1 | Status: SHIPPED | OUTPATIENT
Start: 2025-02-27

## 2025-03-25 ENCOUNTER — APPOINTMENT (OUTPATIENT)
Dept: PRIMARY CARE | Facility: CLINIC | Age: 45
End: 2025-03-25
Payer: COMMERCIAL

## 2025-03-25 ENCOUNTER — OFFICE VISIT (OUTPATIENT)
Dept: PRIMARY CARE | Facility: CLINIC | Age: 45
End: 2025-03-25
Payer: COMMERCIAL

## 2025-03-25 DIAGNOSIS — E78.5 HYPERLIPIDEMIA, UNSPECIFIED HYPERLIPIDEMIA TYPE: ICD-10-CM

## 2025-03-25 DIAGNOSIS — M25.552 BILATERAL HIP PAIN: ICD-10-CM

## 2025-03-25 DIAGNOSIS — G89.29 CHRONIC PAIN OF RIGHT KNEE: ICD-10-CM

## 2025-03-25 DIAGNOSIS — I10 ESSENTIAL HYPERTENSION WITH GOAL BLOOD PRESSURE LESS THAN 130/85: Primary | ICD-10-CM

## 2025-03-25 DIAGNOSIS — G89.29 CHRONIC FOOT PAIN, RIGHT: ICD-10-CM

## 2025-03-25 DIAGNOSIS — E78.5 DYSLIPIDEMIA, GOAL LDL BELOW 100: ICD-10-CM

## 2025-03-25 DIAGNOSIS — M25.561 CHRONIC PAIN OF RIGHT KNEE: ICD-10-CM

## 2025-03-25 DIAGNOSIS — M79.671 CHRONIC FOOT PAIN, RIGHT: ICD-10-CM

## 2025-03-25 DIAGNOSIS — M25.551 BILATERAL HIP PAIN: ICD-10-CM

## 2025-03-25 DIAGNOSIS — Z86.018 H/O LIPOMA: ICD-10-CM

## 2025-03-25 PROCEDURE — 3080F DIAST BP >= 90 MM HG: CPT | Performed by: INTERNAL MEDICINE

## 2025-03-25 PROCEDURE — 99215 OFFICE O/P EST HI 40 MIN: CPT | Performed by: INTERNAL MEDICINE

## 2025-03-25 PROCEDURE — 3008F BODY MASS INDEX DOCD: CPT | Performed by: INTERNAL MEDICINE

## 2025-03-25 PROCEDURE — 1036F TOBACCO NON-USER: CPT | Performed by: INTERNAL MEDICINE

## 2025-03-25 PROCEDURE — 3075F SYST BP GE 130 - 139MM HG: CPT | Performed by: INTERNAL MEDICINE

## 2025-03-25 RX ORDER — LOSARTAN POTASSIUM AND HYDROCHLOROTHIAZIDE 12.5; 5 MG/1; MG/1
1 TABLET ORAL DAILY
Qty: 30 TABLET | Refills: 11 | Status: SHIPPED | OUTPATIENT
Start: 2025-03-25 | End: 2026-03-25

## 2025-03-25 NOTE — PROGRESS NOTES
"Subjective   Patient ID: Johnny Henry is a 45 y.o. male who presents for Follow-up.    Here for 6-month follow-up    He has a number of health concerns.  He had a rib fracture in mid September-after a fall in a hot tub.  Improved    He has been working out at the gym since mid January, 3-6 times weekly.  He is also adopted a meal plan based on an inge called [employer], 4 smaller meals daily, focusing on protein.  Also using whey protein along with creatinine    He was doing indoor sprints, and jogging and his had right knee pain now several weeks.    Right first toe is ongoing sore at times at times for several years.  No history of trauma.  It does not take much to make this worse    Bilateral hip pain laterally an issue.  He tends to sleep on his sides.  He does have a history of a torn left labrum    Home blood pressures remain elevated typically 130s 140s over the 80s range    He has multiple lipomas.  His brother and cousin have had lipomas.  Some are sore and he would like to have these removed         Review of Systems    Objective   BP (!) 138/98   Pulse 72   Ht 1.803 m (5' 11\")   Wt 94.4 kg (208 lb 3.2 oz)   SpO2 100%   BMI 29.04 kg/m²     Physical Exam  Vitals reviewed.   Constitutional:       Appearance: Normal appearance.   HENT:      Head: Normocephalic and atraumatic.   Eyes:      General: No scleral icterus.        Right eye: No discharge.         Left eye: No discharge.      Extraocular Movements: Extraocular movements intact.      Conjunctiva/sclera: Conjunctivae normal.      Pupils: Pupils are equal, round, and reactive to light.   Cardiovascular:      Rate and Rhythm: Normal rate and regular rhythm.      Pulses: Normal pulses.      Heart sounds: Normal heart sounds. No murmur heard.  Pulmonary:      Effort: Pulmonary effort is normal.      Breath sounds: Normal breath sounds. No wheezing or rhonchi.   Musculoskeletal:         General: No deformity or signs of injury. Normal range of motion. "      Cervical back: Normal range of motion and neck supple. No rigidity or tenderness.      Comments: He located lipomas over his lateral left thigh area    Hip pain located laterally along the bursa area bilaterally    He located the right knee pain laterally.  No effusion    He located the right foot pain at the base of his right first toe   Lymphadenopathy:      Cervical: No cervical adenopathy.   Skin:     General: Skin is warm and dry.      Findings: No rash.   Neurological:      General: No focal deficit present.      Mental Status: He is alert and oriented to person, place, and time. Mental status is at baseline.      Cranial Nerves: No cranial nerve deficit.      Sensory: No sensory deficit.      Gait: Gait normal.   Psychiatric:         Mood and Affect: Mood normal.         Behavior: Behavior normal.         Thought Content: Thought content normal.         Judgment: Judgment normal.         Assessment/Plan   Problem List Items Addressed This Visit             ICD-10-CM    Dyslipidemia, goal LDL below 100 E78.5    Relevant Orders    Lipid Panel    Alanine Aminotransferase    TSH with reflex to Free T4 if abnormal    Essential hypertension with goal blood pressure less than 130/85 - Primary I10    Relevant Medications    losartan-hydrochlorothiazide (Hyzaar) 50-12.5 mg tablet    Other Relevant Orders    Basic Metabolic Panel    Chronic pain of right knee M25.561, G89.29    Relevant Orders    Referral to Orthopedics and Sports Medicine    XR knee right 4+ views    Chronic foot pain, right M79.671, G89.29    Relevant Orders    Referral to Orthopedics and Sports Medicine    XR foot right 3+ views    Bilateral hip pain M25.551, M25.552    Relevant Orders    Referral to Physical Therapy    H/O lipoma Z86.018    Relevant Orders    Referral to General Surgery     Other Visit Diagnoses         Codes    Hyperlipidemia, unspecified hyperlipidemia type     E78.5                Portions of this encounter note have been  copied from my previous note dated 9/17/24  , which have been updated where appropriate and all reflect my current medical decision making from today.     We spoke for over 38 minutes, over half the time counseling.  I spent over 5 minutes additionally on documentation        White coat syndrome with hypertension-              9/24-blood pressure elevated in the office.  Blood pressure typically better at home.  He will check his blood pressure 2-3 times weekly and call if the blood pressure average consistently over 130/90.  He will follow-up in 8 weeks to review this in further detail.             3/25-he met with our pharmacy team, his home machine correlates, home blood pressure readings consistently over 130.  Blood pressure in the office quite high.  He will begin losartan/HCTZ daily and check labs in about 3 weeks.  He will check his blood pressure after lunch or after dinner, at least 3 times weekly and will follow this log at each visit.  He will notify me if the blood pressures consistently over 130/88        dyslipidemia/elevated weight-we'll check annual lipids. With his wife as a dietitian, his diet remains quite healthy.   June '22 Lipids relatively favorable. Though LDL is 127. BMI 28. He'll resume exercising             10/23-lipids unfortunately worsened- range.  BMI 28.  Exercise and weight loss               4/24-BMI 28.3.  Will continue to follow and check lipids before follow-up              9/24   (was 191 1 yr ago)             3/25-BMI 29.0.  Will check fasting labs soon.  Working to lose weight using the  [facility] program over the last 8 weeks since mid January.  4 smaller meals more protein     Exercise routine-he has enjoyed walking daily and playing golf.  In 2023,  Considering his knee-suggested swimming, elliptical or recumbent cycle 3 to 4 days weekly              4/24-he is swimming 30 to 40 minutes, 3 days weekly at his rec center in [place].  It has been a good  workout for him              3/25-he is working out at the gym 3 to 6 days weekly consistently, running has caused some pain-encouraged cardiovascular    Hx Left shoulder pain-improved presently after orthopedic input    Hx Left foot pain-evaluated in April 2024-in urgent care after he was injured when a tree fell on his foot.  Improved presently after Ortho evaluation    Right first MTP joint pain-suspect DJD.             3/25-x-rays ordered.  Encouraged optimal arch support and follow-up with concerns with podiatry    Right knee pain-          3/25-worse recently after sprinting on an indoor track, suggesting possible meniscal injury.  With his chronic pain he will set up an orthopedic evaluation.  Presently no mechanical symptoms such as locking clicking or swelling but clearly the chronic pain and the mechanism of injury is worrisome.  For now he will avoid sprinting.  X-ray ordered      S/p left knee arthroscopy for left knee meniscal injury- from January 2023 ski accident.              Left knee scope Jan '24 w/ Dr. Gonzalez. Back to full ROM    Bilateral hip pain-           3/25-laterally-exam suggest either bursitis, piriformis syndrome or IT band syndrome.  He will set up physical therapy to evaluate and manage these.  He will try to refrain from sleeping on his sides at night if possible     Right hip - saw specialist in 2018. He has a small right hip spur.  Mild right hip arthritis on early 2024 x-ray.  Doing well presently with swimming routine    Left hip mild arthritis and labrum tear-      January 20 24 bursa injection with Dr. Budinski was helpful.  MRI showed mild arthritis and labrum tear.  He will continue stretching.  Improved with swimming         Left rib fracture-9/24-after he slipped in a hot tub.  Resolved           Functional sinusitis / Seasonal congestion/eustachian tube presently off Zyrtec and fluticasone following spring '18 allergy and ENT eval per Dr. Espino and Dr. Pinzon. humidified  air in winter and if needed, restart Singulair. He will follow up either here or there with concerns.     Reactive airway disease - now running 3 x wk this summer, following unremarkable PFTs per Dr. Espino summer '18. When necessary Singulair in the winter if needed.       Right scapular spasm-heating pad and stretching encouraged follow-up with concerns for this mild trapezius muscle strain most noticeable work. Encouraged optimizing position at work particularly sitting with his work station. Unfortunately he does not have the standing desk option.    Colon cancer screening-colonoscopy ordered 9/24        Dental plan/bruxism- he will continue dental visits semiannually. mouthguard nightly has been used, but he forgets often he notes.     Back nevi - noticed by spouse. These may represent seborrheic keratoses. He will see dermatology accordingly. Saw Dermatologist in spring '19 - Dr. Jean Baptiste, who froze several lesions.     Contact lenses / vision care - Dr. Melissa Davey annually      Hx Panic attacks with situational depression/anxiety-originally occurred around 2000 after he got out of the [employer], when his parents were getting a divorce and he had not yet been able to restart school. [facility] helpful. He notes that as an adolescent he had ADHD issues-in social anxiety issues as well in the past.   His symptoms recurred early June 2021 while they were putting off her down on the house that he and his wife decided to buy after some 14 years in their small starter home. His anxiousness and apprehension and overall uneasiness is quite similar to what happened before he notes. After establishing with psychology-Dr. Sam Pruett and starting fluoxetine and working with psychiatry his symptoms improved dramatically.  He has not needed anxiety medicines in some time.              10/23-on 20 mg of fluoxetine consistently he is done quite well this year.  He did have some sleep issues when he  had 's over for 6 weeks.  Improved presently.  I suggested trying to reduce the fluoxetine from 20 down to 10 mg daily.  He will call with concerns               4/24-doing well on low-dose fluoxetine.  Swimming 3 days weekly helps relieve stress as well.  He will continue this             9/24-doing well with low-dose fluoxetine.  He will continue            3/25-he stopped the fluoxetine this last winter.  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  His boys are busy and his family is planning a 2-week trip to [place] with the entire family in June 2025.         Sleeplessness - Longstanding issue for yrs. mainly wakes at 3am. he understands fluoxetine will help this. Advised to stop medicating with alprazolam for sleep aid. Encouraged to continue practicing sleep hygiene and melatonin trial - start w/ 10 mg, advance to 25-30mg and use consistently. He has found that [employer] melatonin 10 mg sufficient and will continue as needed               4/24-sleeping better              3/25-he is off the fluoxetine.  He notes perhaps 2-3 nights weekly he just wakes up early, and then it is rough the rest of the day feeling a bit jet like to when he does have to get up.  Discussed a maintenance medicine at night such as sertraline.  For now he will try to optimize his sleep hygiene.  Will defer that for now.      Summer safety concerns - sun screen, tick precautions, insect repellant, yard work safety encouraged last year at his physical        Flu shot encouraged after Labor Day- updated 10/24/23      Tetanus - updated June '22     Follow-up 6 months, sooner as needed-as scheduled in 8 weeks-before his trip to [place] for 2 weeks with his family     Charting was completed using voice recognition technology and may include unintended errors.

## 2025-03-26 VITALS
HEIGHT: 71 IN | WEIGHT: 208.2 LBS | OXYGEN SATURATION: 100 % | BODY MASS INDEX: 29.15 KG/M2 | DIASTOLIC BLOOD PRESSURE: 98 MMHG | SYSTOLIC BLOOD PRESSURE: 138 MMHG | HEART RATE: 72 BPM

## 2025-03-26 PROBLEM — M79.671 CHRONIC FOOT PAIN, RIGHT: Status: ACTIVE | Noted: 2025-03-26

## 2025-03-26 PROBLEM — M25.561 CHRONIC PAIN OF RIGHT KNEE: Status: ACTIVE | Noted: 2025-03-26

## 2025-03-26 PROBLEM — J45.909 REACTIVE AIRWAY DISEASE (HHS-HCC): Status: RESOLVED | Noted: 2023-08-10 | Resolved: 2025-03-26

## 2025-03-26 PROBLEM — M25.551 BILATERAL HIP PAIN: Status: ACTIVE | Noted: 2025-03-26

## 2025-03-26 PROBLEM — I10 ESSENTIAL HYPERTENSION WITH GOAL BLOOD PRESSURE LESS THAN 130/85: Status: ACTIVE | Noted: 2025-03-26

## 2025-03-26 PROBLEM — G89.29 CHRONIC PAIN OF RIGHT KNEE: Status: ACTIVE | Noted: 2025-03-26

## 2025-03-26 PROBLEM — G89.29 CHRONIC FOOT PAIN, RIGHT: Status: ACTIVE | Noted: 2025-03-26

## 2025-03-26 PROBLEM — M25.552 BILATERAL HIP PAIN: Status: ACTIVE | Noted: 2025-03-26

## 2025-03-26 PROBLEM — Z86.018: Status: ACTIVE | Noted: 2025-03-26

## 2025-03-31 ENCOUNTER — HOSPITAL ENCOUNTER (OUTPATIENT)
Dept: RADIOLOGY | Facility: CLINIC | Age: 45
Discharge: HOME | End: 2025-03-31
Payer: COMMERCIAL

## 2025-03-31 DIAGNOSIS — G89.29 CHRONIC PAIN OF RIGHT KNEE: ICD-10-CM

## 2025-03-31 DIAGNOSIS — M79.671 CHRONIC FOOT PAIN, RIGHT: ICD-10-CM

## 2025-03-31 DIAGNOSIS — M25.561 CHRONIC PAIN OF RIGHT KNEE: ICD-10-CM

## 2025-03-31 DIAGNOSIS — G89.29 CHRONIC FOOT PAIN, RIGHT: ICD-10-CM

## 2025-03-31 PROCEDURE — 73562 X-RAY EXAM OF KNEE 3: CPT | Mod: RIGHT SIDE | Performed by: RADIOLOGY

## 2025-03-31 PROCEDURE — 73562 X-RAY EXAM OF KNEE 3: CPT | Mod: RT

## 2025-03-31 PROCEDURE — 73630 X-RAY EXAM OF FOOT: CPT | Mod: RIGHT SIDE | Performed by: RADIOLOGY

## 2025-03-31 PROCEDURE — 73630 X-RAY EXAM OF FOOT: CPT | Mod: RT

## 2025-03-31 NOTE — PROGRESS NOTES
Chief Complaint   Patient presents with    Right Knee - Pain     Xray@ 3/25/25     History of Present Illness:  Johnny Henry is a 45 y.o. male presenting to clinic as a established patient for his right knee. DOS: Left knee arthroscopy medial and lateral meniscectomy on 1/17/24. His left knee is doing well. His right knee has started to bother him. He was doing sprints on a indoor track about a month ago when he aggravated the knee. He has recurrent mechanical symptoms and swelling. He started a new exercise regimen in January and is down 10-15 pounds. He injured the right foot last year. It has been bothering him since.     Imaging:  X-rays right knee: Shows no acute fractures or dislocations. No arthritic change.       Assessment:   Right knee internal derangement  Right great toe MTP arthritis    Plan:  We reviewed the role of imaging, physical therapy, injections and the time frame to healing and correlation with outcome.  Medrol Dosepak  NSAID: Ibuprofen consistently for one week then as needed. GI side effects and medical risks discussed.  Ice: 60 minutes on and off  Exercise home program: Medically directed knee therapy / Handout given.   Prescription for shoe insert for his right MTP arthritis   MRI of the right knee for meniscus tear. We will see him back after MRI to discuss the results and come up with a more definitive plan.      Physical Exam:  Right Knee:  Pain along medial joint line   Trace effusion  Positive Darrion´s test/PositiveApley Grind  Neurovascular exam normal distally  Palpable pedal pulse and good cap refill     Right foot: Pain over the MTP joint of the right great toe.      Review of Systems:  GENERAL: Negative for malaise, significant weight loss, fever  MUSCULOSKELETAL: See HPI  NEURO:  Negative     Past Medical History:   Diagnosis Date    Acute frontal sinusitis, unspecified 03/27/2018    Acute frontal sinusitis    Acute maxillary sinusitis, unspecified 12/11/2019     Subacute maxillary sinusitis    Acute tonsillitis, unspecified 08/14/2016    Acute tonsillitis    Allergic contact dermatitis due to plants, except food 10/03/2019    Poison ivy dermatitis    Chronic ethmoidal sinusitis 11/30/2021    Chronic ethmoidal sinusitis    Chronic rhinitis 02/14/2020    Chronic rhinosinusitis    Depression     Elevated blood-pressure reading, without diagnosis of hypertension 07/30/2014    Elevated blood pressure reading without diagnosis of hypertension    Elevated blood-pressure reading, without diagnosis of hypertension 06/28/2022    Blood pressure elevated without history of HTN    Encounter for examination of ears and hearing without abnormal findings 08/07/2018    Encounter for hearing evaluation    Hesitancy of micturition 03/10/2021    Urinary hesitancy    Lyme disease, unspecified     Lyme disease    Other abnormal auditory perceptions, unspecified ear 05/23/2018    Abnormal auditory perception    Other conditions influencing health status     Pneumonia    Other conditions influencing health status 09/14/2016    Counseling About Travel    Other specified disorders of ear, bilateral 03/30/2020    Ear fullness, bilateral    Other symptoms and signs involving general sensations and perceptions 04/21/2021    Facial pressure    Pain in left knee 08/10/2017    Left knee pain, unspecified chronicity    Pain in right hip 08/08/2019    Hip pain, right    Personal history of other diseases of the nervous system and sense organs 01/05/2016    History of acute otitis media    Personal history of other diseases of the nervous system and sense organs 05/16/2018    History of eustachian tube dysfunction    Personal history of other diseases of the nervous system and sense organs 06/21/2015    History of acute otitis media    Personal history of other diseases of the nervous system and sense organs 05/23/2018    History of eustachian tube dysfunction    Personal history of other diseases of the  respiratory system 04/13/2019    History of tonsillitis    Personal history of other diseases of the respiratory system 08/14/2016    History of sore throat    Personal history of other diseases of the respiratory system 12/12/2017    History of acute bronchitis    Personal history of other diseases of the respiratory system 04/13/2019    History of upper respiratory infection    Personal history of other diseases of the respiratory system 11/24/2014    History of sinusitis    Personal history of other diseases of the respiratory system 08/08/2018    History of chronic sinusitis    Personal history of other specified conditions 11/30/2021    History of nasal congestion    Personal history of other specified conditions 08/23/2013    History of nausea    Personal history of other specified conditions 09/26/2022    History of insomnia    Personal history of other specified conditions 04/21/2021    History of facial pain    Unspecified hearing loss, left ear 12/26/2019    Hearing loss, left       Medication Documentation Review Audit       Reviewed by Gerardo Nevarez MD (Physician) on 03/26/25 at 0514      Medication Order Taking? Sig Documenting Provider Last Dose Status    Patient not taking:   Discontinued 03/26/25 0514    Patient not taking:   Discontinued 03/26/25 0514   losartan-hydrochlorothiazide (Hyzaar) 50-12.5 mg tablet [id number]  Take 1 tablet by mouth once daily. Gerardo Nevarez MD  Active                    No Known Allergies    Social History     Socioeconomic History    Marital status:      Spouse name: Not on file    Number of children: Not on file    Years of education: Not on file    Highest education level: Not on file   Occupational History    Not on file   Tobacco Use    Smoking status: Never     Passive exposure: Never    Smokeless tobacco: Never   Substance and Sexual Activity    Alcohol use: Yes     Alcohol/week: 2.0 standard drinks of alcohol     Types: 2 Standard drinks or equivalent per week      Comment: moderate    Drug use: Never    Sexual activity: Defer   Other Topics Concern    Not on file   Social History Narrative    Not on file     Social Drivers of Health     Financial Resource Strain: Not on file   Food Insecurity: Not on file   Transportation Needs: Not on file   Physical Activity: Not on file   Stress: Not on file   Social Connections: Not on file   Intimate Partner Violence: Not on file   Housing Stability: Not on file       Past Surgical History:   Procedure Laterality Date    KNEE ARTHROSCOPY W/ MENISCECTOMY Left 01/15/2024    LASIK      WISDOM TOOTH EXTRACTION         Imaging  XR foot right 3+ views    Result Date: 3/31/2025  Degenerative changes, severely affecting the 1st metatarsophalangeal joint.     MACRO: None   Signed by: Monse Flores 3/31/2025 6:49 PM Dictation workstation:   [id number]    XR knee right 3 views    Result Date: 3/31/2025  No acute fracture or dislocation.   Soft tissue swelling.   MACRO: None   Signed by: Monse Flores 3/31/2025 6:48 PM Dictation workstation:   [id number]     Cardiology, Vascular, and Other Imaging  No other imaging results found for the past 7 days         Scribe Attestation  By signing my name below, IHeather, Scribjaswinder   attest that this documentation has been prepared under the direction and in the presence of Jules Gonzalez MD.

## 2025-04-01 ENCOUNTER — OFFICE VISIT (OUTPATIENT)
Dept: ORTHOPEDIC SURGERY | Facility: CLINIC | Age: 45
End: 2025-04-01
Payer: COMMERCIAL

## 2025-04-01 DIAGNOSIS — S83.241A ACUTE MEDIAL MENISCUS TEAR, RIGHT, INITIAL ENCOUNTER: ICD-10-CM

## 2025-04-01 DIAGNOSIS — M19.079 ARTHRITIS OF METATARSOPHALANGEAL (MTP) JOINT OF GREAT TOE: ICD-10-CM

## 2025-04-01 PROCEDURE — 99214 OFFICE O/P EST MOD 30 MIN: CPT | Performed by: ORTHOPAEDIC SURGERY

## 2025-04-01 RX ORDER — IBUPROFEN 800 MG/1
800 TABLET ORAL EVERY 8 HOURS PRN
Qty: 90 TABLET | Refills: 2 | Status: SHIPPED | OUTPATIENT
Start: 2025-04-01

## 2025-04-01 RX ORDER — METHYLPREDNISOLONE 4 MG/1
TABLET ORAL
Qty: 21 TABLET | Refills: 0 | Status: SHIPPED | OUTPATIENT
Start: 2025-04-01

## 2025-04-03 LAB
ALT SERPL-CCNC: 22 U/L (ref 9–46)
ANION GAP SERPL CALCULATED.4IONS-SCNC: 10 MMOL/L (CALC) (ref 7–17)
BUN SERPL-MCNC: 25 MG/DL (ref 7–25)
BUN/CREAT SERPL: 18 (CALC) (ref 6–22)
CALCIUM SERPL-MCNC: 9.5 MG/DL (ref 8.6–10.3)
CHLORIDE SERPL-SCNC: 100 MMOL/L (ref 98–110)
CHOLEST SERPL-MCNC: 260 MG/DL
CHOLEST/HDLC SERPL: 5.5 (CALC)
CO2 SERPL-SCNC: 29 MMOL/L (ref 20–32)
CREAT SERPL-MCNC: 1.38 MG/DL (ref 0.6–1.29)
EGFRCR SERPLBLD CKD-EPI 2021: 64 ML/MIN/1.73M2
GLUCOSE SERPL-MCNC: 91 MG/DL (ref 65–99)
HDLC SERPL-MCNC: 47 MG/DL
LDLC SERPL CALC-MCNC: 184 MG/DL (CALC)
NONHDLC SERPL-MCNC: 213 MG/DL (CALC)
POTASSIUM SERPL-SCNC: 4.3 MMOL/L (ref 3.5–5.3)
SODIUM SERPL-SCNC: 139 MMOL/L (ref 135–146)
TRIGL SERPL-MCNC: 146 MG/DL
TSH SERPL-ACNC: 2.4 MIU/L (ref 0.4–4.5)

## 2025-04-05 NOTE — RESULT ENCOUNTER NOTE
Johnny-ellie for doing the x-ray of the right foot.  The radiologist notes arthritis, especially noted to be severe at the base of the first toe.  It appears there may be a remote injury to this area.    As we discussed in the office, please continue to wear well-fitting shoes and if symptoms persist, please [place] back with podiatry.    Sincerely,  Gerardo Nevarez MD

## 2025-04-05 NOTE — RESULT ENCOUNTER NOTE
Johnny-I am glad the x-ray of the right knee showed no worrisome findings.  There is some soft tissue swelling around the kneecap but no obvious fluid within the knee joint.  Please let me know if any symptoms persist.    Sincerely,  Gerardo Nevarez MD

## 2025-04-06 DIAGNOSIS — R79.89 ELEVATED SERUM CREATININE: Primary | ICD-10-CM

## 2025-04-14 ENCOUNTER — APPOINTMENT (OUTPATIENT)
Dept: RADIOLOGY | Facility: CLINIC | Age: 45
End: 2025-04-14
Payer: COMMERCIAL

## 2025-04-15 DIAGNOSIS — I10 ESSENTIAL HYPERTENSION WITH GOAL BLOOD PRESSURE LESS THAN 130/85: ICD-10-CM

## 2025-04-15 RX ORDER — LOSARTAN POTASSIUM AND HYDROCHLOROTHIAZIDE 12.5; 5 MG/1; MG/1
1 TABLET ORAL DAILY
Qty: 30 TABLET | Refills: 11 | Status: SHIPPED | OUTPATIENT
Start: 2025-04-15 | End: 2026-04-15

## 2025-05-02 ENCOUNTER — APPOINTMENT (OUTPATIENT)
Dept: PODIATRY | Facility: CLINIC | Age: 45
End: 2025-05-02
Payer: COMMERCIAL

## 2025-05-28 ENCOUNTER — APPOINTMENT (OUTPATIENT)
Dept: PRIMARY CARE | Facility: CLINIC | Age: 45
End: 2025-05-28
Payer: COMMERCIAL

## 2025-05-28 VITALS
SYSTOLIC BLOOD PRESSURE: 124 MMHG | HEIGHT: 71 IN | WEIGHT: 204 LBS | OXYGEN SATURATION: 99 % | BODY MASS INDEX: 28.56 KG/M2 | RESPIRATION RATE: 16 BRPM | HEART RATE: 74 BPM | DIASTOLIC BLOOD PRESSURE: 84 MMHG

## 2025-05-28 DIAGNOSIS — E78.5 DYSLIPIDEMIA, GOAL LDL BELOW 100: ICD-10-CM

## 2025-05-28 DIAGNOSIS — I10 ESSENTIAL HYPERTENSION WITH GOAL BLOOD PRESSURE LESS THAN 130/85: ICD-10-CM

## 2025-05-28 DIAGNOSIS — E55.9 VITAMIN D DEFICIENCY: ICD-10-CM

## 2025-05-28 DIAGNOSIS — E78.5 HYPERLIPIDEMIA, UNSPECIFIED HYPERLIPIDEMIA TYPE: Primary | ICD-10-CM

## 2025-05-28 PROCEDURE — 99214 OFFICE O/P EST MOD 30 MIN: CPT | Performed by: INTERNAL MEDICINE

## 2025-05-28 PROCEDURE — 3008F BODY MASS INDEX DOCD: CPT | Performed by: INTERNAL MEDICINE

## 2025-05-28 PROCEDURE — 3078F DIAST BP <80 MM HG: CPT | Performed by: INTERNAL MEDICINE

## 2025-05-28 PROCEDURE — 3074F SYST BP LT 130 MM HG: CPT | Performed by: INTERNAL MEDICINE

## 2025-05-28 PROCEDURE — 1036F TOBACCO NON-USER: CPT | Performed by: INTERNAL MEDICINE

## 2025-05-28 NOTE — PROGRESS NOTES
"Subjective   Patient ID: Johnny Henry is a 45 y.o. male who presents for No chief complaint on file..    Here for follow-up  Still somewhat frustrated with knee symptoms.  He is given up running  This weekend he was doing yard work and his right toe was more sore  Scheduled to see podiatrist in June after his trip to [place] for several weeks with his extended family  Has a home blood pressure machine-typically 130/70 range         Review of Systems    Objective   /84   Pulse 74   Resp 16   Ht 1.803 m (5' 11\")   Wt 92.5 kg (204 lb)   SpO2 99%   BMI 28.45 kg/m²     Physical Exam  Vitals reviewed.   Constitutional:       Appearance: Normal appearance.   HENT:      Head: Normocephalic and atraumatic.   Eyes:      General: No scleral icterus.        Right eye: No discharge.         Left eye: No discharge.      Extraocular Movements: Extraocular movements intact.      Conjunctiva/sclera: Conjunctivae normal.      Pupils: Pupils are equal, round, and reactive to light.   Cardiovascular:      Rate and Rhythm: Normal rate and regular rhythm.      Pulses: Normal pulses.      Heart sounds: Normal heart sounds. No murmur heard.  Pulmonary:      Effort: Pulmonary effort is normal.      Breath sounds: Normal breath sounds. No wheezing or rhonchi.   Musculoskeletal:         General: No deformity or signs of injury. Normal range of motion.      Cervical back: Normal range of motion and neck supple. No rigidity or tenderness.      Comments: Right foot with pain located first MTP joint no obvious effusion   Lymphadenopathy:      Cervical: No cervical adenopathy.   Skin:     General: Skin is warm and dry.      Findings: No rash.   Neurological:      General: No focal deficit present.      Mental Status: He is alert and oriented to person, place, and time. Mental status is at baseline.      Cranial Nerves: No cranial nerve deficit.      Sensory: No sensory deficit.      Gait: Gait normal.   Psychiatric:         Mood and " Affect: Mood normal.         Behavior: Behavior normal.         Thought Content: Thought content normal.         Judgment: Judgment normal.         Assessment/Plan   Problem List Items Addressed This Visit           ICD-10-CM    Dyslipidemia, goal LDL below 100 E78.5    Relevant Orders    Lipid Panel    Alanine Aminotransferase    Vitamin D deficiency E55.9    Essential hypertension with goal blood pressure less than 130/85 I10    Relevant Orders    Basic Metabolic Panel     Other Visit Diagnoses         Codes      Hyperlipidemia, unspecified hyperlipidemia type    -  Primary E78.5    Relevant Orders    Lipid Panel                Portions of this encounter note have been copied from my previous note dated 3/25/25  , which have been updated where appropriate and all reflect my current medical decision making from today.           White coat syndrome with hypertension-              9/24-blood pressure elevated in the office.  Blood pressure typically better at home.  He will check his blood pressure 2-3 times weekly and call if the blood pressure average consistently over 130/90.  He will follow-up in 8 weeks to review this in further detail.             3/25-he met with our pharmacy team, his home machine correlates, home blood pressure readings consistently over 130.  Blood pressure in the office quite high.  He will begin losartan/HCTZ daily and check labs in about 3 weeks.  He will check his blood pressure after lunch or after dinner, at least 3 times weekly and will follow this log at each visit.  He will notify me if the blood pressures consistently over 130/88              5/25-blood pressure improved.  Home blood pressure machine typically in the 130/70 range.  He will continue to follow this        dyslipidemia/elevated weight-we'll check annual lipids. With his wife as a dietitian, his diet remains quite healthy.   June '22 Lipids relatively favorable. Though LDL is 127. BMI 28. He'll resume exercising              10/23-lipids unfortunately worsened- range.  BMI 28.  Exercise and weight loss               4/24-BMI 28.3.  Will continue to follow and check lipids before follow-up              9/24   (was 191 1 yr ago)             3/25-BMI 29.0.  Will check fasting labs soon.  Working to lose weight using the  enercast program over the last 8 weeks since mid January.  4 smaller meals more protein           5/25-BMI 28.45.  He will continue his healthy lifestyle measures and reassess lipids before follow-up     Exercise routine-he has enjoyed walking daily and playing golf.  In 2023,  Considering his knee-suggested swimming, elliptical or recumbent cycle 3 to 4 days weekly              4/24-he is swimming 30 to 40 minutes, 3 days weekly at his rec center in [place].  It has been a good workout for him              3/25-he is working out at the gym 3 to 6 days weekly consistently, running has caused some pain-encouraged cardiovascular             5/25-he is doing high intensity workouts-typically 15 minutes of cardio.  Handout provided explaining heart rate goals-with exercise and cardiac fitness-ideally exercising at a heart rate for 25 to 30 minutes 4 to 5 days weekly in his target zone around 145 bpm      Right first MTP joint pain-suspect DJD.             3/25-x-rays ordered.  Encouraged optimal arch support and follow-up with concerns with podiatry            5/25-advanced arthritis noted on his March 2025 foot x-ray.  Much worse recently doing yard work up and down on his feet.  He was provided an orthotic in orthopedics.  He will see Dr. Auguste in podiatry mid June.  Encouraged optimal fitting shoes    Right knee pain-          3/25-worse recently after sprinting on an indoor track, suggesting possible meniscal injury.  With his chronic pain he will set up an orthopedic evaluation.  Presently no mechanical symptoms such as locking clicking or swelling but clearly the chronic pain and the mechanism of  injury is worrisome.  For now he will avoid sprinting.  X-ray ordered             5/25-x-rays completed-MRI on hold because of the cost.  He is orthopedics.  Generally improved.  He is a bit disappointed to give up running but ellipticals better tolerated encouraged caution      S/p left knee arthroscopy for left knee meniscal injury- from January 2023 ski accident.              Left knee scope Jan '24 w/ Dr. Gonzalez. Back to full ROM    Bilateral hip pain-           3/25-laterally-exam suggest either bursitis, piriformis syndrome or IT band syndrome.  He will set up physical therapy to evaluate and manage these.  He will try to refrain from sleeping on his sides at night if possible            5/25-improved     Right hip - saw specialist in 2018. He has a small right hip spur.  Mild right hip arthritis on early 2024 x-ray.  Doing well presently with swimming routine    Left hip mild arthritis and labrum tear-      January 20 24 bursa injection with Dr. Budinski was helpful.  MRI showed mild arthritis and labrum tear.  He will continue stretching.  Improved with swimming         Left rib fracture-9/24-after he slipped in a hot tub.  Resolved           Functional sinusitis / Seasonal congestion/eustachian tube presently off Zyrtec and fluticasone following spring '18 allergy and ENT eval per Dr. Espino and Dr. Pinzon. humidified air in winter and if needed, restart Singulair. He will follow up either here or there with concerns.     Reactive airway disease - now running 3 x wk this summer, following unremarkable PFTs per Dr. Espino summer '18. When necessary Singulair in the winter if needed.       Right scapular spasm-heating pad and stretching encouraged follow-up with concerns for this mild trapezius muscle strain most noticeable work. Encouraged optimizing position at work particularly sitting with his work station. Unfortunately he does not have the standing desk option.    Colon cancer screening-colonoscopy  ordered 9/24        Dental plan/bruxism- he will continue dental visits semiannually. mouthguard nightly has been used, but he forgets often he notes.     Back nevi - noticed by spouse. These may represent seborrheic keratoses. He will see dermatology accordingly. Saw Dermatologist in spring '19 - Dr. Jean Baptiste, who froze several lesions.     Contact lenses / vision care - Dr. Melissa Davey annually      Hx Panic attacks with situational depression/anxiety-originally occurred around 2000 after he got out of the [employer], when his parents were getting a divorce and he had not yet been able to restart school. [facility]. He notes that as an adolescent he had ADHD issues-in social anxiety issues as well in the past.   His symptoms recurred early June 2021 while they were putting off her down on the house that he and his wife decided to buy after some 14 years in their small starter home. His anxiousness and apprehension and overall uneasiness is quite similar to what happened before he notes. After establishing with psychology-Dr. Sam Pruett and starting fluoxetine and working with psychiatry his symptoms improved dramatically.  He has not needed anxiety medicines in some time.              10/23-on 20 mg of fluoxetine consistently he is done quite well this year.  He did have some sleep issues when he had 's over for 6 weeks.  Improved presently.  I suggested trying to reduce the fluoxetine from 20 down to 10 mg daily.  He will call with concerns               4/24-doing well on low-dose fluoxetine.  Swimming 3 days weekly helps relieve stress as well.  He will continue this             9/24-doing well with low-dose fluoxetine.  He will continue            3/25-he stopped the fluoxetine this last winter.  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  His boys are busy and his  family is planning a 2-week trip to [place] with the entire family in June 2025.               5/25-looking forward to extended trip to [place] with his extended family.         Sleeplessness - Longstanding issue for yrs. mainly wakes at 3am. he understands fluoxetine will help this. Advised to stop medicating with alprazolam for sleep aid. Encouraged to continue practicing sleep hygiene and melatonin trial - start w/ 10 mg, advance to 25-30mg and use consistently. He has found that [employer] melatonin 10 mg sufficient and will continue as needed               4/24-sleeping better              3/25-he is off the fluoxetine.  He notes perhaps 2-3 nights weekly he just wakes up early, and then it is rough the rest of the day feeling a bit jet like to when he does have to get up.  Discussed a maintenance medicine at night such as sertraline.  For now he will try to optimize his sleep hygiene.  Will defer that for now.               5/25-doing better     Summer safety concerns - sun screen, tick precautions, insect repellant, yard work safety encouraged last year at his physical        Flu shot encouraged after Labor Day- updated 10/24/23      Tetanus - updated June '22     Follow-up 6 months, sooner as needed     Charting was completed using voice recognition technology and may include unintended errors.

## 2025-06-18 ENCOUNTER — OFFICE VISIT (OUTPATIENT)
Dept: PODIATRY | Facility: CLINIC | Age: 45
End: 2025-06-18
Payer: COMMERCIAL

## 2025-06-18 DIAGNOSIS — M79.671 PAIN IN BOTH FEET: ICD-10-CM

## 2025-06-18 DIAGNOSIS — M79.672 PAIN IN BOTH FEET: ICD-10-CM

## 2025-06-18 DIAGNOSIS — M20.5X1 HALLUX LIMITUS OF RIGHT FOOT: Primary | ICD-10-CM

## 2025-06-18 PROCEDURE — 99203 OFFICE O/P NEW LOW 30 MIN: CPT | Performed by: PODIATRIST

## 2025-06-18 PROCEDURE — 1036F TOBACCO NON-USER: CPT | Performed by: PODIATRIST

## 2025-06-18 PROCEDURE — 99213 OFFICE O/P EST LOW 20 MIN: CPT | Performed by: PODIATRIST

## 2025-06-18 NOTE — PROGRESS NOTES
"Chief Complaint   Patient presents with    Toe Pain     RT BIG TOE PAIN AFTER INJURY     HPI: Patient is here for evaluation of right foot pain.  Last July fell and landed on foot in plantarflexed psoiton, hit a bucket and then the ground,  Had pain initially dosral metatarsals and MPJs.  Re-injured 1st MPJ R 1-2 months later.  Now has constant every day pain in 1st MPJ.  Had x-rays recently  Pain with dorsiflexion of toes R.  Wore [place]oka on 3 week vacation recently, had no pain with prolonged walking.  Did have pain with \"push off\".  Got insert from [employer], did not help.   Pain increases with dress shoes for work  Pain increases with activities like playing basketball    PMH, PSx, Medications and allergies reviewed.  ROS negative except for what is stated in HPI.    Physical Exam  Patient alert, oriented, no acute distress    VASC: +2/4 pedal pulses B/L.  CFT brisk all digits.  Skin temperature is warm to warm proximal to distal B/L.  (+)hair growth B/L.   No edema noted B/L.    NEURO: Vibratory intact B/L.  Light touch intact B/L.     DERM: No ecchymosis, no erythema, no ulcerations noted B/L.  Webspaces dry, clean and intact B/L    MUSCULOSKEL: +5/5 muscle strength B/L.    Decreased 1st MPJ ROM R  Pain on palpation of the dorsal 1st MPJ R  No pain on medial first metatarsal head or on plantar first metatarsal head R  No pain on palpation of the PIPJ of the right hallux or at the medial aspect  No pain on palpation of the navicular R    Assessment and Plan  #1  Hallux limitus right (osteoarthritis )  Discussed pathology and treatment options  Reviewed x-rays from March 25, 2025 in detail with patient.  X-rays do show decreased joint space with bone spurring of the first MPJ and the PIPJ, cystic changes in the first metatarsal head, old chip fracture of the medial aspect of the PIPJ of the right hallux, os navicular versus old fracture of the medial navicular  Discussed proper shoe gear.  Shoes should have a thick " supportive sole.  Avoid barefoot walking  Avoid dress shoes that are too narrow or pointed toebox  Rx: Topical combination cream  Discussed the use of cortisone injections in the joint sparingly  Activity as tolerated  Follow-up as needed

## 2025-06-25 ENCOUNTER — TELEPHONE (OUTPATIENT)
Dept: PRIMARY CARE | Facility: CLINIC | Age: 45
End: 2025-06-25
Payer: COMMERCIAL

## 2025-06-25 DIAGNOSIS — F43.21 ADJUSTMENT DISORDER WITH DEPRESSED MOOD: ICD-10-CM

## 2025-06-25 DIAGNOSIS — F41.9 ANXIETY: ICD-10-CM

## 2025-06-25 RX ORDER — FLUOXETINE 20 MG/1
20 CAPSULE ORAL DAILY
Qty: 30 CAPSULE | Refills: 3 | Status: SHIPPED | OUTPATIENT
Start: 2025-06-25 | End: 2025-10-23

## 2025-06-25 NOTE — TELEPHONE ENCOUNTER
Spoke with patient who states that he felt more anxious and about stress, both before and during his extended trip to [place] for over 2 weeks with his extended family.  He restarted the fluoxetine, 20 mg daily and now after a week feels a bit better.  He understands importance of optimizing exercise and reducing stress as he is able to.  He will continue to do so.  He will notify me if symptoms not improving.  He has a longstanding history of anxiety and understands how the medications can improve the situations, but also if not improved, he understands the need to [place] back accordingly.  He will call with additional concerns otherwise follow-up as scheduled

## 2025-06-25 NOTE — TELEPHONE ENCOUNTER
Patient sent [employer] message;      Rohith Sheth, I wanted to let you know that I was having some issues on our trip to [place] at the beginning of June so I ended up starting back on the fluoxetine medication. Nothing bad and only like a 4 out of 10.  10 being 2021, but didn?t want to get worse.  I am better now but probably makes sense to just go back on meds indefinitely.    Please let me know if I need to come see you or what next steps are.    I responded by telling him I would call him later today

## 2025-07-16 DIAGNOSIS — I10 ESSENTIAL HYPERTENSION WITH GOAL BLOOD PRESSURE LESS THAN 130/85: ICD-10-CM

## 2025-07-16 RX ORDER — LOSARTAN POTASSIUM AND HYDROCHLOROTHIAZIDE 12.5; 5 MG/1; MG/1
1 TABLET ORAL DAILY
Qty: 30 TABLET | Refills: 11 | Status: SHIPPED | OUTPATIENT
Start: 2025-07-16 | End: 2026-07-16

## 2025-10-16 ENCOUNTER — APPOINTMENT (OUTPATIENT)
Dept: PRIMARY CARE | Facility: CLINIC | Age: 45
End: 2025-10-16
Payer: COMMERCIAL

## 2025-12-02 ENCOUNTER — APPOINTMENT (OUTPATIENT)
Dept: PRIMARY CARE | Facility: CLINIC | Age: 45
End: 2025-12-02
Payer: COMMERCIAL

## 2026-05-28 ENCOUNTER — APPOINTMENT (OUTPATIENT)
Dept: PRIMARY CARE | Facility: CLINIC | Age: 46
End: 2026-05-28
Payer: COMMERCIAL

## (undated) DEVICE — PROBE, SERFAS, 3.5MM, 50 S, ENERGY SUCTION SYSTEM

## (undated) DEVICE — DRESSING, GAUZE, SPONGE, 12 PLY, 4 X 4 IN, PLASTIC POUCH, STRL 10PK

## (undated) DEVICE — GLOVE, SURGICAL, PROTEXIS PI BLUE W/NEUTHERA, 8.0, PF, LF

## (undated) DEVICE — SOLUTION, TOPICAL, ALCOHOL, ISOPROPYL 70%, 16 OZ

## (undated) DEVICE — DRESSING, ABDOMINAL PAD, CURITY, 7.5 X 8 IN

## (undated) DEVICE — MARKER, SKIN, W/ RULER, LF, STERILE

## (undated) DEVICE — GLOVE, SURGICAL, PROTEXIS PI , 7.0, PF, LF

## (undated) DEVICE — GLOVE, SURGICAL, ORTHO, PROTEXIS, HYDROGEL, 8.0, PF, LATEX

## (undated) DEVICE — STRAP, ARM BOARD, 32 X 1.5

## (undated) DEVICE — SUTURE, MONOCRYL, 3-0, PS-1 27IN, UNDYED

## (undated) DEVICE — GLOVE, SURGICAL, PROTEXIS PI , 8.0, PF, LF

## (undated) DEVICE — HOLSTER, ELECTROSURGERY ACCESSORY, STERILE

## (undated) DEVICE — TOWEL PACK 10-PK

## (undated) DEVICE — TUBING, PUMP REDEUCE 8FT STERILE

## (undated) DEVICE — TIP, SUCTION, YANKAUER, FLEXIBLE

## (undated) DEVICE — PADDING, CAST, SPECIALIST, 6 IN X 4 YD, STERILE

## (undated) DEVICE — GOWN, SURGICAL, IMPLT, BACK, XLARGE, XLONG, STERILE

## (undated) DEVICE — Device

## (undated) DEVICE — TUBING, PATIENT 8FT STERILE

## (undated) DEVICE — GLOVE, SURGICAL, PROTEXIS PI BLUE W/NEUTHERA, 6.5, PF, LF

## (undated) DEVICE — GLOVE, SURGICAL, PROTEXIS PI , 6.5, PF, LF

## (undated) DEVICE — DRAPE, SHEET, THREE QUARTER, FAN FOLD, 57 X 77 IN

## (undated) DEVICE — CUFF, TOURNIQUET, DUAL PORT, SNG BLADDER, 30 IN, PLC

## (undated) DEVICE — MANIFOLD, 4 PORT NEPTUNE STANDARD

## (undated) DEVICE — COVER, MAYO STAND, W/PAD, 23 IN, DISPOSABLE, PLASTIC, LF, STERILE

## (undated) DEVICE — GLOVE, SURGICAL, PROTEXIS PI BLUE W/NEUTHERA, 7.0, PF, LF

## (undated) DEVICE — CUP, MEDICINE, GRADUATED, 2 OZ, PLASTIC, DISP, LF

## (undated) DEVICE — DRAPE, SHEET, U, W/ADHESIVE STRIP, IMPERVIOUS, 60 X 70 IN, DISPOSABLE, LF, STERILE

## (undated) DEVICE — STRAP, VELCRO, BODY, 4 X 60IN, NS

## (undated) DEVICE — BLADE, GEN COATED 2.75, LF

## (undated) DEVICE — MAT, FLOOR, STANDARD FLUID BARRIER, 32X44, GREEN

## (undated) DEVICE — HANDLE, LITE EZ, PLASTIC, DISP, LF

## (undated) DEVICE — BANDAGE, ELASTIC, 6 X 10YD, BEIGE, LF

## (undated) DEVICE — PREP, IODOPHOR, W/ALCOHOL, DURAPREP, W/APPLICATOR, 26 CC

## (undated) DEVICE — TUBING, SUCTION, 6MM X 10, CLEAN N-COND